# Patient Record
Sex: MALE | Race: WHITE | Employment: FULL TIME | RURAL
[De-identification: names, ages, dates, MRNs, and addresses within clinical notes are randomized per-mention and may not be internally consistent; named-entity substitution may affect disease eponyms.]

---

## 2017-02-13 ENCOUNTER — TELEPHONE (OUTPATIENT)
Dept: FAMILY MEDICINE CLINIC | Age: 52
End: 2017-02-13

## 2017-02-13 NOTE — TELEPHONE ENCOUNTER
Patient's son was seen last week for the flu. He now has symptoms. Ramno. We need to call wife on what we'll do.

## 2017-10-06 ENCOUNTER — HOSPITAL ENCOUNTER (EMERGENCY)
Age: 52
Discharge: HOME | End: 2017-10-06
Attending: EMERGENCY MEDICINE
Payer: COMMERCIAL

## 2017-10-06 ENCOUNTER — APPOINTMENT (OUTPATIENT)
Dept: GENERAL RADIOLOGY | Age: 52
End: 2017-10-06
Attending: EMERGENCY MEDICINE
Payer: COMMERCIAL

## 2017-10-06 VITALS
TEMPERATURE: 99.7 F | DIASTOLIC BLOOD PRESSURE: 93 MMHG | HEART RATE: 89 BPM | OXYGEN SATURATION: 98 % | SYSTOLIC BLOOD PRESSURE: 162 MMHG | HEIGHT: 68 IN | RESPIRATION RATE: 11 BRPM

## 2017-10-06 DIAGNOSIS — R07.89 ATYPICAL CHEST PAIN: Primary | ICD-10-CM

## 2017-10-06 LAB
ALBUMIN SERPL-MCNC: 4.3 G/DL (ref 3.5–5)
ALBUMIN/GLOB SERPL: 1.4 {RATIO} (ref 1.1–2.2)
ALP SERPL-CCNC: 89 U/L (ref 45–117)
ALT SERPL-CCNC: 50 U/L (ref 12–78)
ANION GAP SERPL CALC-SCNC: 10 MMOL/L (ref 5–15)
AST SERPL-CCNC: 25 U/L (ref 15–37)
BASOPHILS # BLD: 0.1 K/UL (ref 0–0.1)
BASOPHILS NFR BLD: 1 % (ref 0–1)
BILIRUB SERPL-MCNC: 0.7 MG/DL (ref 0.2–1)
BUN SERPL-MCNC: 10 MG/DL (ref 6–20)
BUN/CREAT SERPL: 10 (ref 12–20)
CALCIUM SERPL-MCNC: 8.7 MG/DL (ref 8.5–10.1)
CHLORIDE SERPL-SCNC: 105 MMOL/L (ref 97–108)
CK SERPL-CCNC: 145 U/L (ref 39–308)
CO2 SERPL-SCNC: 25 MMOL/L (ref 21–32)
CREAT SERPL-MCNC: 1.03 MG/DL (ref 0.7–1.3)
D DIMER PPP FEU-MCNC: <0.17 MG/L FEU (ref 0–0.65)
EOSINOPHIL # BLD: 0.1 K/UL (ref 0–0.4)
EOSINOPHIL NFR BLD: 1 % (ref 0–7)
ERYTHROCYTE [DISTWIDTH] IN BLOOD BY AUTOMATED COUNT: 12.9 % (ref 11.5–14.5)
GLOBULIN SER CALC-MCNC: 3.1 G/DL (ref 2–4)
GLUCOSE SERPL-MCNC: 104 MG/DL (ref 65–100)
HCT VFR BLD AUTO: 44.2 % (ref 36.6–50.3)
HGB BLD-MCNC: 15.5 G/DL (ref 12.1–17)
LYMPHOCYTES # BLD: 3 K/UL (ref 0.8–3.5)
LYMPHOCYTES NFR BLD: 35 % (ref 12–49)
MCH RBC QN AUTO: 30.3 PG (ref 26–34)
MCHC RBC AUTO-ENTMCNC: 35.1 G/DL (ref 30–36.5)
MCV RBC AUTO: 86.3 FL (ref 80–99)
MONOCYTES # BLD: 0.5 K/UL (ref 0–1)
MONOCYTES NFR BLD: 6 % (ref 5–13)
NEUTS SEG # BLD: 4.9 K/UL (ref 1.8–8)
NEUTS SEG NFR BLD: 57 % (ref 32–75)
PLATELET # BLD AUTO: 220 K/UL (ref 150–400)
POTASSIUM SERPL-SCNC: 3.9 MMOL/L (ref 3.5–5.1)
PROT SERPL-MCNC: 7.4 G/DL (ref 6.4–8.2)
RBC # BLD AUTO: 5.12 M/UL (ref 4.1–5.7)
SODIUM SERPL-SCNC: 140 MMOL/L (ref 136–145)
TROPONIN I SERPL-MCNC: <0.04 NG/ML
TROPONIN I SERPL-MCNC: <0.04 NG/ML
WBC # BLD AUTO: 8.6 K/UL (ref 4.1–11.1)

## 2017-10-06 PROCEDURE — 71010 XR CHEST PORT: CPT

## 2017-10-06 PROCEDURE — 84484 ASSAY OF TROPONIN QUANT: CPT | Performed by: EMERGENCY MEDICINE

## 2017-10-06 PROCEDURE — 74011000250 HC RX REV CODE- 250: Performed by: EMERGENCY MEDICINE

## 2017-10-06 PROCEDURE — 80053 COMPREHEN METABOLIC PANEL: CPT | Performed by: EMERGENCY MEDICINE

## 2017-10-06 PROCEDURE — 85025 COMPLETE CBC W/AUTO DIFF WBC: CPT | Performed by: EMERGENCY MEDICINE

## 2017-10-06 PROCEDURE — 99284 EMERGENCY DEPT VISIT MOD MDM: CPT

## 2017-10-06 PROCEDURE — 85379 FIBRIN DEGRADATION QUANT: CPT | Performed by: EMERGENCY MEDICINE

## 2017-10-06 PROCEDURE — 82550 ASSAY OF CK (CPK): CPT | Performed by: EMERGENCY MEDICINE

## 2017-10-06 PROCEDURE — 93005 ELECTROCARDIOGRAM TRACING: CPT

## 2017-10-06 PROCEDURE — 74011250637 HC RX REV CODE- 250/637: Performed by: EMERGENCY MEDICINE

## 2017-10-06 PROCEDURE — 36415 COLL VENOUS BLD VENIPUNCTURE: CPT | Performed by: EMERGENCY MEDICINE

## 2017-10-06 RX ORDER — LIDOCAINE HYDROCHLORIDE 20 MG/ML
10 SOLUTION OROPHARYNGEAL
Status: COMPLETED | OUTPATIENT
Start: 2017-10-06 | End: 2017-10-06

## 2017-10-06 RX ADMIN — LIDOCAINE HYDROCHLORIDE 10 ML: 20 SOLUTION ORAL; TOPICAL at 21:29

## 2017-10-06 RX ADMIN — ALUMINUM HYDROXIDE AND MAGNESIUM HYDROXIDE 30 ML: 200; 200 SUSPENSION ORAL at 21:29

## 2017-10-07 LAB
ATRIAL RATE: 90 BPM
CALCULATED P AXIS, ECG09: 63 DEGREES
CALCULATED R AXIS, ECG10: 63 DEGREES
CALCULATED T AXIS, ECG11: 53 DEGREES
DIAGNOSIS, 93000: NORMAL
P-R INTERVAL, ECG05: 138 MS
Q-T INTERVAL, ECG07: 354 MS
QRS DURATION, ECG06: 80 MS
QTC CALCULATION (BEZET), ECG08: 433 MS
VENTRICULAR RATE, ECG03: 90 BPM

## 2017-10-07 NOTE — ED PROVIDER NOTES
[facility].  EMERGENCY DEPARTMENT HISTORY AND PHYSICAL EXAM       Date of Service: 10/6/2017   Patient Name: Medhat Benitez   YOB: 1965  Medical Record Number: [id number]    History of Presenting Illness     Chief Complaint   Patient presents with    Chest Pain     hx of costachondritis, woke up around 0200 this morning, has been off and on all day, sx's increase when he is still        History Provided By:  patient    Additional History:   Medhat Benitez is a 46 y.o. male with PMhx significant for GERD, anxiety, costochondritis, HTN, diverticulosis who presents ambulatory to the ED with cc of intermittent dull pressure like mid-sternal CP that onset this morning at 0200. He reports taking anti-acids with no relief of his symptoms. Pt states that his symptoms are exacerbated at rest and with certain movement and relieved with activity. Pt reports a hx of chest pain secondary to acid reflux, costochondritis, and hiatal hernia. Pt reports having lower back pain that is unchanged from baseline. He reports having 2 cardiac stress tests noting that his last was 1 year ago and states that they were both negative. Pt reports a paternal FMHx significant for MI. He denies hx of DVT or PE. He denies any recent travels or surgeries. Pt specifically denies any cough, hemoptysis, N/V/D, diaphoresis, abdominal pain, SOB, fevers, or chills. Social Hx: - Tobacco, - EtOH, - Illicit Drugs    There are no other complaints, changes or physical findings at this time.     Primary Care Provider: None    Past History     Past Medical History:   Past Medical History:   Diagnosis Date    Anxiety     Costochondritis     Diverticulosis     GERD (gastroesophageal reflux disease)     Hypertension     Rhinitis, allergic         Past Surgical History:   Past Surgical History:   Procedure Laterality Date    HX TONSIL AND ADENOIDECTOMY Bilateral         Family History:   Family History Problem Relation Age of Onset    Cancer Mother 48     colon    Diabetes Father     Arrhythmia Father      a fib    Heart Disease Father     Arthritis-osteo Sister     COPD Maternal Grandfather     Heart Disease Paternal Grandmother     Stroke Paternal Grandmother     Cancer Paternal Grandfather      Head/Neck--smoker        Social History:   Social History   Substance Use Topics    Smoking status: Never Smoker    Smokeless tobacco: Not on file    Alcohol use No        Allergies: Allergies   Allergen Reactions    Demerol [Meperidine] Anaphylaxis and Other (comments)     Caused BP To Drop.  Aleve [Naproxen Sodium] Hives    Phenergan [Promethazine] Unknown (comments)         Review of Systems   Review of Systems   Constitutional: Negative for chills, diaphoresis, fatigue and fever. HENT: Positive for congestion. Negative for rhinorrhea and sore throat. Eyes: Negative for pain, discharge and visual disturbance. Respiratory: Negative for cough, chest tightness, shortness of breath and wheezing.         - hemoptysis   Cardiovascular: Positive for chest pain (mis-sternal). Negative for palpitations and leg swelling. Gastrointestinal: Negative for abdominal pain, constipation, diarrhea, nausea and vomiting. Genitourinary: Negative for dysuria, frequency and hematuria. Musculoskeletal: Positive for back pain (baseline unchanged). Negative for arthralgias and myalgias. Skin: Negative for rash. Neurological: Negative for dizziness, weakness, light-headedness and headaches. Psychiatric/Behavioral: Negative. Physical Exam    Physical Exam   Constitutional: He is oriented to person, place, and time. He appears well-developed and well-nourished. No distress. HENT:   Head: Normocephalic and atraumatic. Eyes: EOM are normal. Right eye exhibits no discharge. Left eye exhibits no discharge. No scleral icterus. Neck: Normal range of motion. Neck supple. No tracheal deviation present. Cardiovascular: Normal rate, regular rhythm, normal heart sounds and intact distal pulses. Exam reveals no gallop and no friction rub. No murmur heard. Pulmonary/Chest: Effort normal and breath sounds normal. No respiratory distress. He has no wheezes. He has no rales. Abdominal: Soft. He exhibits no distension. There is no tenderness. Musculoskeletal: Normal range of motion. He exhibits no edema. Anterior chest wall tenderness. No calf tenderness. Lymphadenopathy:     He has no cervical adenopathy. Neurological: He is alert and oriented to person, place, and time. Skin: Skin is warm and dry. No rash noted. Psychiatric: He has a normal mood and affect. Nursing note and vitals reviewed. Medical Decision Making   I am the first provider for this patient. I reviewed the vital signs, available nursing notes, past medical history, past surgical history, family history and social history. Old Medical Records: Old medical records. Nursing notes. Provider Notes:   Patient is a 45 yo male who presents to ED with chest pain that is reproducible on exam.  Differential includes atypical chest pain, stable angina, unstable angina, MI, PE, pleurisy, costochondritis, pneumonia, bronchitis, MSK pain. Do not suspect dissection.  - CBC, CMP  - Geovanna  - D-dimer  - CXR    ED Course:  9:20 PM   Initial assessment performed. The patients presenting problems have been discussed, and they are in agreement with the care plan formulated and outlined with them. I have encouraged them to ask questions as they arise throughout their visit. PROGRESS NOTE:  10:30 PM  Troponin negative x 2, d-dimer negative. Heart score 3 so will discharge with cardiology follow up. Discussed diagnosis, follow up, return instructions, test results, x-rays and medications with the patient and/or family. The patient and/or family have been given the opportunity to ask questions.   The patient and/or family express understanding of the plan of care, follow-up appointments and return instructions. The patient and/or family agree to follow up with cardiology as directed and to return immediately if the chest pain worsens. The patient and family express understanding that although cardiac testing at this time is negative, a cardiac problem could still be present and that a follow-up appointment with a cardiologist for further evaluation and risk factor modification is necessary to complete the evaluation of this complaint. Provided customary return to ED instructions. Vivien Shane MD    Diagnostic Study Results     Labs -      Recent Results (from the past 12 hour(s))   EKG, 12 LEAD, INITIAL    Collection Time: 10/06/17  7:40 PM   Result Value Ref Range    Ventricular Rate 90 BPM    Atrial Rate 90 BPM    P-R Interval 138 ms    QRS Duration 80 ms    Q-T Interval 354 ms    QTC Calculation (Bezet) 433 ms    Calculated P Axis 63 degrees    Calculated R Axis 63 degrees    Calculated T Axis 53 degrees    Diagnosis       Normal sinus rhythm  Normal ECG  No previous ECGs available     CBC WITH AUTOMATED DIFF    Collection Time: 10/06/17  7:59 PM   Result Value Ref Range    WBC 8.6 4.1 - 11.1 K/uL    RBC 5.12 4.10 - 5.70 M/uL    HGB 15.5 12.1 - 17.0 g/dL    HCT 44.2 36.6 - 50.3 %    MCV 86.3 80.0 - 99.0 FL    MCH 30.3 26.0 - 34.0 PG    MCHC 35.1 30.0 - 36.5 g/dL    RDW 12.9 11.5 - 14.5 %    PLATELET 295 359 - 457 K/uL    NEUTROPHILS 57 32 - 75 %    LYMPHOCYTES 35 12 - 49 %    MONOCYTES 6 5 - 13 %    EOSINOPHILS 1 0 - 7 %    BASOPHILS 1 0 - 1 %    ABS. NEUTROPHILS 4.9 1.8 - 8.0 K/UL    ABS. LYMPHOCYTES 3.0 0.8 - 3.5 K/UL    ABS. MONOCYTES 0.5 0.0 - 1.0 K/UL    ABS. EOSINOPHILS 0.1 0.0 - 0.4 K/UL    ABS.  BASOPHILS 0.1 0.0 - 0.1 K/UL   METABOLIC PANEL, COMPREHENSIVE    Collection Time: 10/06/17  7:59 PM   Result Value Ref Range    Sodium 140 136 - 145 mmol/L    Potassium 3.9 3.5 - 5.1 mmol/L    Chloride 105 97 - 108 mmol/L    CO2 25 21 - 32 mmol/L    Anion gap 10 5 - 15 mmol/L    Glucose 104 (H) 65 - 100 mg/dL    BUN 10 6 - 20 MG/DL    Creatinine 1.03 0.70 - 1.30 MG/DL    BUN/Creatinine ratio 10 (L) 12 - 20      GFR est AA >60 >60 ml/min/1.73m2    GFR est non-AA >60 >60 ml/min/1.73m2    Calcium 8.7 8.5 - 10.1 MG/DL    Bilirubin, total 0.7 0.2 - 1.0 MG/DL    ALT (SGPT) 50 12 - 78 U/L    AST (SGOT) 25 15 - 37 U/L    Alk. phosphatase 89 45 - 117 U/L    Protein, total 7.4 6.4 - 8.2 g/dL    Albumin 4.3 3.5 - 5.0 g/dL    Globulin 3.1 2.0 - 4.0 g/dL    A-G Ratio 1.4 1.1 - 2.2     CK W/ REFLX CKMB    Collection Time: 10/06/17  7:59 PM   Result Value Ref Range     39 - 308 U/L   TROPONIN I    Collection Time: 10/06/17  7:59 PM   Result Value Ref Range    Troponin-I, Qt. <0.04 <0.05 ng/mL   D DIMER    Collection Time: 10/06/17  7:59 PM   Result Value Ref Range    D-dimer <0.17 0.00 - 0.65 mg/L FEU   TROPONIN I    Collection Time: 10/06/17  9:49 PM   Result Value Ref Range    Troponin-I, Qt. <0.04 <0.05 ng/mL       Radiologic Studies -  The following have been ordered and reviewed:    CXR Results  (Last 48 hours)               10/06/17 2007  XR CHEST PORT Final result    Impression:  IMPRESSION:    Clear lungs. Narrative:  PORTABLE CHEST RADIOGRAPH/S: 10/6/2017 8:07 PM       INDICATION: Chest pain. Costochondritis, with symptoms beginning at 0200 hours   and intermittent throughout the day. COMPARISON: None. TECHNIQUE: Portable frontal upright radiograph/s of the chest.       FINDINGS:    The lungs are clear. The central airways are patent. No pneumothorax or pleural   effusion. Vital Signs-Reviewed the patient's vital signs.    Patient Vitals for the past 12 hrs:   Temp Pulse Resp BP SpO2   10/06/17 2200 - 89 11 (!) 162/93 98 %   10/06/17 2030 - 88 16 152/80 98 %   10/06/17 1940 99.7 °F (37.6 °C) 90 16 - 100 %       Medications Given in the ED:  Medications   aluminum-magnesium hydroxide (MAALOX) oral suspension 30 mL (30 mL Oral Given 10/6/17 2129)   lidocaine (XYLOCAINE) 2 % viscous solution 10 mL (10 mL Mouth/Throat Given 10/6/17 2129)     EKG    EKG interpretation: (Preliminary) 1940  Rhythm: normal sinus rhythm; and regular . Rate (approx.): 90; Axis: normal; P wave: normal; QRS interval: normal ; ST/T wave: normal;   Written by Kadi Camacho, ED scribe, as dictated by Antonia Oswald MD    Diagnosis   Clinical Impression:   1. Atypical chest pain         Disposition Note:    1:   Follow-up Information     Follow up With Details Comments Contact Info    Barbara Hernandez MD  Please follow up with cardiology as soon as possible [phone] [phone]      None In 3 days Please follow up with primary care provider None [phone] Patient stated that they have no PCP      [facility] EMERGENCY DEPT  As needed, If symptoms worsen [address] [place] [address]  [phone]        2:   Current Discharge Medication List        Return to ED if Worse    DISCHARGE NOTE  10:34 PM  The patient has been re-evaluated and is ready for discharge. Reviewed available results with patient. Counseled patient on diagnosis and care plan. Patient has expressed understanding, and all questions have been answered. Patient agrees with plan and agrees to follow up as recommended, or return to the ED if their symptoms worsen. Discharge instructions have been provided and explained to the patient, along with reasons to return to the ED. This note is prepared by Kadi Camacho, acting as Scribe for Antonia Oswald MD.    Antonia Oswald MD: The scribe's documentation has been prepared under my direction and personally reviewed by me in its entirety. I confirm that the note above accurately reflects all work, treatment, procedures, and medical decision making performed by me.

## 2017-10-07 NOTE — ED NOTES
Dr. Luna Mckay reviewed discharge instructions with the patient. The patient verbalized understanding.

## 2017-10-07 NOTE — DISCHARGE INSTRUCTIONS
Chest Pain: Care Instructions  Your Care Instructions  There are many things that can cause chest pain. Some are not serious and will get better on their own in a few days. But some kinds of chest pain need more testing and treatment. Your doctor may have recommended a follow-up visit in the next 8 to 12 hours. If you are not getting better, you may need more tests or treatment. Even though your doctor has released you, you still need to watch for any problems. The doctor carefully checked you, but sometimes problems can develop later. If you have new symptoms or if your symptoms do not get better, get medical care right away. If you have worse or different chest pain or pressure that lasts more than 5 minutes or you passed out (lost consciousness), call 911 or seek other emergency help right away. A medical visit is only one step in your treatment. Even if you feel better, you still need to do what your doctor recommends, such as going to all suggested follow-up appointments and taking medicines exactly as directed. This will help you recover and help prevent future problems. How can you care for yourself at home? · Rest until you feel better. · Take your medicine exactly as prescribed. Call your doctor if you think you are having a problem with your medicine. · Do not drive after taking a prescription pain medicine. When should you call for help? Call 911 if:  · You passed out (lost consciousness). · You have severe difficulty breathing. · You have symptoms of a heart attack. These may include:  ¨ Chest pain or pressure, or a strange feeling in your chest.  ¨ Sweating. ¨ Shortness of breath. ¨ Nausea or vomiting. ¨ Pain, pressure, or a strange feeling in your back, neck, jaw, or upper belly or in one or both shoulders or arms. ¨ Lightheadedness or sudden weakness. ¨ A fast or irregular heartbeat.   After you call 911, the  may tell you to chew 1 adult-strength or 2 to 4 low-dose aspirin. Wait for an ambulance. Do not try to drive yourself. Call your doctor today if:  · You have any trouble breathing. · Your chest pain gets worse. · You are dizzy or lightheaded, or you feel like you may faint. · You are not getting better as expected. · You are having new or different chest pain. Where can you learn more? Go to http://anisa-dulce.info/. Enter A120 in the search box to learn more about \"Chest Pain: Care Instructions. \"  Current as of: March 20, 2017  Content Version: 11.3  © 0359-7617 [employer]. Care instructions adapted under license by [employer] ( [employer] ) (which disclaims liability or warranty for this information). If you have questions about a medical condition or this instruction, always ask your healthcare professional. [address] any warranty or liability for your use of this information.

## 2022-05-25 ENCOUNTER — HOSPITAL ENCOUNTER (EMERGENCY)
Age: 57
Discharge: HOME | End: 2022-05-25
Attending: FAMILY MEDICINE
Payer: COMMERCIAL

## 2022-05-25 VITALS
BODY MASS INDEX: 34.86 KG/M2 | RESPIRATION RATE: 16 BRPM | OXYGEN SATURATION: 98 % | TEMPERATURE: 99.4 F | SYSTOLIC BLOOD PRESSURE: 190 MMHG | WEIGHT: 230 LBS | HEART RATE: 90 BPM | HEIGHT: 68 IN | DIASTOLIC BLOOD PRESSURE: 97 MMHG

## 2022-05-25 DIAGNOSIS — I10 HYPERTENSION, UNSPECIFIED TYPE: Primary | ICD-10-CM

## 2022-05-25 LAB
ALBUMIN SERPL-MCNC: 4.5 G/DL (ref 3.5–5)
ALBUMIN/GLOB SERPL: 1.4 {RATIO} (ref 1.1–2.2)
ALP SERPL-CCNC: 100 U/L (ref 45–117)
ALT SERPL-CCNC: 62 U/L (ref 12–78)
ANION GAP SERPL CALC-SCNC: 12 MMOL/L (ref 5–15)
AST SERPL-CCNC: 37 U/L (ref 15–37)
BASOPHILS # BLD: 0.1 K/UL (ref 0–0.1)
BASOPHILS NFR BLD: 1 % (ref 0–1)
BILIRUB SERPL-MCNC: 0.7 MG/DL (ref 0.2–1)
BUN SERPL-MCNC: 8 MG/DL (ref 6–20)
BUN/CREAT SERPL: 8 (ref 12–20)
CALCIUM SERPL-MCNC: 9.1 MG/DL (ref 8.5–10.1)
CHLORIDE SERPL-SCNC: 102 MMOL/L (ref 97–108)
CO2 SERPL-SCNC: 27 MMOL/L (ref 21–32)
CREAT SERPL-MCNC: 0.98 MG/DL (ref 0.7–1.3)
DIFFERENTIAL METHOD BLD: NORMAL
EOSINOPHIL # BLD: 0.1 K/UL (ref 0–0.4)
EOSINOPHIL NFR BLD: 2 % (ref 0–7)
ERYTHROCYTE [DISTWIDTH] IN BLOOD BY AUTOMATED COUNT: 12.6 % (ref 11.5–14.5)
GLOBULIN SER CALC-MCNC: 3.2 G/DL (ref 2–4)
GLUCOSE SERPL-MCNC: 146 MG/DL (ref 65–100)
HCT VFR BLD AUTO: 46.7 % (ref 36.6–50.3)
HGB BLD-MCNC: 16.6 G/DL (ref 12.1–17)
IMM GRANULOCYTES # BLD AUTO: 0 K/UL (ref 0–0.04)
IMM GRANULOCYTES NFR BLD AUTO: 0 % (ref 0–0.5)
LYMPHOCYTES # BLD: 2.7 K/UL (ref 0.8–3.5)
LYMPHOCYTES NFR BLD: 32 % (ref 12–49)
MCH RBC QN AUTO: 30.5 PG (ref 26–34)
MCHC RBC AUTO-ENTMCNC: 35.5 G/DL (ref 30–36.5)
MCV RBC AUTO: 85.7 FL (ref 80–99)
MONOCYTES # BLD: 0.7 K/UL (ref 0–1)
MONOCYTES NFR BLD: 8 % (ref 5–13)
NEUTS SEG # BLD: 4.9 K/UL (ref 1.8–8)
NEUTS SEG NFR BLD: 57 % (ref 32–75)
NRBC # BLD: 0 K/UL (ref 0–0.01)
NRBC BLD-RTO: 0 PER 100 WBC
PLATELET # BLD AUTO: 224 K/UL (ref 150–400)
PMV BLD AUTO: 10.1 FL (ref 8.9–12.9)
POTASSIUM SERPL-SCNC: 3.6 MMOL/L (ref 3.5–5.1)
PROT SERPL-MCNC: 7.7 G/DL (ref 6.4–8.2)
RBC # BLD AUTO: 5.45 M/UL (ref 4.1–5.7)
SODIUM SERPL-SCNC: 141 MMOL/L (ref 136–145)
WBC # BLD AUTO: 8.5 K/UL (ref 4.1–11.1)

## 2022-05-25 PROCEDURE — 36415 COLL VENOUS BLD VENIPUNCTURE: CPT

## 2022-05-25 PROCEDURE — 74011250637 HC RX REV CODE- 250/637: Performed by: FAMILY MEDICINE

## 2022-05-25 PROCEDURE — 85025 COMPLETE CBC W/AUTO DIFF WBC: CPT

## 2022-05-25 PROCEDURE — 80053 COMPREHEN METABOLIC PANEL: CPT

## 2022-05-25 PROCEDURE — 93005 ELECTROCARDIOGRAM TRACING: CPT

## 2022-05-25 PROCEDURE — 99284 EMERGENCY DEPT VISIT MOD MDM: CPT

## 2022-05-25 RX ORDER — AMLODIPINE BESYLATE 5 MG/1
5 TABLET ORAL DAILY
Qty: 15 TABLET | Refills: 0 | Status: SHIPPED | OUTPATIENT
Start: 2022-05-25 | End: 2022-06-06 | Stop reason: DRUGHIGH

## 2022-05-25 RX ORDER — AMLODIPINE BESYLATE 5 MG/1
5 TABLET ORAL
Status: COMPLETED | OUTPATIENT
Start: 2022-05-25 | End: 2022-05-25

## 2022-05-25 RX ADMIN — AMLODIPINE BESYLATE 5 MG: 5 TABLET ORAL at 22:39

## 2022-05-26 NOTE — ED PROVIDER NOTES
EMERGENCY DEPARTMENT HISTORY AND PHYSICAL EXAM          Date: 5/25/2022  Patient Name: Agustin Delgadillo    History of Presenting Illness     Chief Complaint   Patient presents with    Hypertension       History Provided By: Patient    HPI: Agustin Delgadillo is a 62 y.o. male, pmhx htn but on no medications, who presents to the ED c/o elevated blood pressure that he noted tonight at home. He has had hypertension in the past that he can sense because he feels anxious and jittery, and tonight he recognized that feeling. He took his blood pressure tonight and found it to be over 576 systolic. He has had no chest pain, dyspnea, headaches, dizziness, or unilateral weakness. He has been on beta blockers in the past and did not tolerate them. PCP: None    Allergies: see list  Social Hx: No tobacco, vaping, EtOH, Illicit Drugs; Lives locally. [employer]. There are no other complaints, changes, or physical findings at this time. Current Outpatient Medications   Medication Sig Dispense Refill    amLODIPine (Norvasc) 5 mg tablet Take 1 Tablet by mouth daily. 15 Tablet 0    KRILL OIL PO Take 1 Cap by mouth daily.  fluticasone (FLONASE) 50 mcg/actuation nasal spray 2 sprays by Both Nostrils route daily. 1 Bottle 5    fexofenadine-pseudoephedrine (ALLEGRA-D 24 HOUR) 180-240 mg per tablet Take 1 Tab by mouth daily.  multivitamin (ONE A DAY) tablet Take 1 Tablet by mouth daily as needed.  omeprazole (PRILOSEC) 20 mg capsule Take 20 mg by mouth daily.          Past History     Past Medical History:  Past Medical History:   Diagnosis Date    Anxiety     Costochondritis     Diverticulosis     GERD (gastroesophageal reflux disease)     Hypertension     Rhinitis, allergic        Past Surgical History:  Past Surgical History:   Procedure Laterality Date    HX TONSIL AND ADENOIDECTOMY Bilateral        Family History:  Family History   Problem Relation Age of Onset    Cancer Mother 48        colon  Diabetes Father     Arrhythmia Father         a fib    Heart Disease Father     OSTEOARTHRITIS Sister     COPD Maternal Grandfather     Heart Disease Paternal Grandmother     Stroke Paternal Grandmother     Cancer Paternal Grandfather         Head/Neck--smoker       Social History:  Social History     Tobacco Use    Smoking status: Never Smoker    Smokeless tobacco: Never Used   Substance Use Topics    Alcohol use: No    Drug use: Not on file       Allergies: Allergies   Allergen Reactions    Demerol [Meperidine] Anaphylaxis and Other (comments)     Caused BP To Drop.  Aleve [Naproxen Sodium] Hives    Phenergan [Promethazine] Unknown (comments)         Review of Systems   Review of Systems   Respiratory: Negative for shortness of breath. Cardiovascular: Negative for chest pain. Neurological: Negative for dizziness, weakness, light-headedness and headaches. Psychiatric/Behavioral: The patient is nervous/anxious. Physical Exam   Physical Exam  Vitals reviewed. Constitutional:       General: He is not in acute distress. Appearance: He is well-developed. He is not diaphoretic. HENT:      Head: Normocephalic and atraumatic. Right Ear: External ear normal.      Left Ear: External ear normal.      Nose: Nose normal.      Mouth/Throat:      Mouth: Mucous membranes are moist.      Pharynx: No oropharyngeal exudate. Eyes:      General: No scleral icterus. Right eye: No discharge. Left eye: No discharge. Conjunctiva/sclera: Conjunctivae normal.      Pupils: Pupils are equal, round, and reactive to light. Neck:      Thyroid: No thyromegaly. Vascular: No JVD. Trachea: No tracheal deviation. Cardiovascular:      Rate and Rhythm: Normal rate and regular rhythm. Heart sounds: Normal heart sounds. No murmur heard. No friction rub. No gallop. Pulmonary:      Effort: Pulmonary effort is normal. No respiratory distress. Breath sounds:  No wheezing or rales. Abdominal:      General: Bowel sounds are normal. There is no distension. Palpations: Abdomen is soft. Tenderness: There is no abdominal tenderness. There is no rebound. Musculoskeletal:         General: No tenderness or deformity. Normal range of motion. Cervical back: Normal range of motion and neck supple. Skin:     General: Skin is warm and dry. Neurological:      Mental Status: He is alert and oriented to person, place, and time. Cranial Nerves: No cranial nerve deficit. Coordination: Coordination normal.      Deep Tendon Reflexes: Reflexes are normal and symmetric. Diagnostic Study Results     Labs -     Recent Results (from the past 12 hour(s))   CBC WITH AUTOMATED DIFF    Collection Time: 05/25/22  9:25 PM   Result Value Ref Range    WBC 8.5 4.1 - 11.1 K/uL    RBC 5.45 4.10 - 5.70 M/uL    HGB 16.6 12.1 - 17.0 g/dL    HCT 46.7 36.6 - 50.3 %    MCV 85.7 80.0 - 99.0 FL    MCH 30.5 26.0 - 34.0 PG    MCHC 35.5 30.0 - 36.5 g/dL    RDW 12.6 11.5 - 14.5 %    PLATELET 115 439 - 406 K/uL    MPV 10.1 8.9 - 12.9 FL    NRBC 0.0 0  WBC    ABSOLUTE NRBC 0.00 0.00 - 0.01 K/uL    NEUTROPHILS 57 32 - 75 %    LYMPHOCYTES 32 12 - 49 %    MONOCYTES 8 5 - 13 %    EOSINOPHILS 2 0 - 7 %    BASOPHILS 1 0 - 1 %    IMMATURE GRANULOCYTES 0 0.0 - 0.5 %    ABS. NEUTROPHILS 4.9 1.8 - 8.0 K/UL    ABS. LYMPHOCYTES 2.7 0.8 - 3.5 K/UL    ABS. MONOCYTES 0.7 0.0 - 1.0 K/UL    ABS. EOSINOPHILS 0.1 0.0 - 0.4 K/UL    ABS. BASOPHILS 0.1 0.0 - 0.1 K/UL    ABS. IMM.  GRANS. 0.0 0.00 - 0.04 K/UL    DF AUTOMATED     METABOLIC PANEL, COMPREHENSIVE    Collection Time: 05/25/22  9:25 PM   Result Value Ref Range    Sodium 141 136 - 145 mmol/L    Potassium 3.6 3.5 - 5.1 mmol/L    Chloride 102 97 - 108 mmol/L    CO2 27 21 - 32 mmol/L    Anion gap 12 5 - 15 mmol/L    Glucose 146 (H) 65 - 100 mg/dL    BUN 8 6 - 20 MG/DL    Creatinine 0.98 0.70 - 1.30 MG/DL    BUN/Creatinine ratio 8 (L) 12 - 20 GFR est AA >60 >60 ml/min/1.73m2    GFR est non-AA >60 >60 ml/min/1.73m2    Calcium 9.1 8.5 - 10.1 MG/DL    Bilirubin, total 0.7 0.2 - 1.0 MG/DL    ALT (SGPT) 62 12 - 78 U/L    AST (SGOT) 37 15 - 37 U/L    Alk. phosphatase 100 45 - 117 U/L    Protein, total 7.7 6.4 - 8.2 g/dL    Albumin 4.5 3.5 - 5.0 g/dL    Globulin 3.2 2.0 - 4.0 g/dL    A-G Ratio 1.4 1.1 - 2.2     EKG, 12 LEAD, INITIAL    Collection Time: 05/25/22  9:25 PM   Result Value Ref Range    Ventricular Rate 94 BPM    Atrial Rate 94 BPM    P-R Interval 148 ms    QRS Duration 88 ms    Q-T Interval 360 ms    QTC Calculation (Bezet) 450 ms    Calculated P Axis 49 degrees    Calculated R Axis 45 degrees    Calculated T Axis 27 degrees    Diagnosis       Normal sinus rhythm  Normal ECG  No previous ECGs available         Radiologic Studies -   No orders to display     CT Results  (Last 48 hours)    None        CXR Results  (Last 48 hours)    None            Medical Decision Making   I am the first provider for this patient. I reviewed the vital signs, available nursing notes, past medical history, past surgical history, family history and social history. Vital Signs-Reviewed the patient's vital signs. Patient Vitals for the past 12 hrs:   Temp Pulse Resp BP SpO2   05/25/22 2250  90 16 (!) 190/97    05/25/22 2239  98  (!) 193/100    05/25/22 2145  96 16 (!) 187/101    05/25/22 2112 99.4 °F (37.4 °C) 99 16 (!) 210/105 98 %       Pulse Oximetry Analysis - 98% on RA    Cardiac Monitor:   Rate: 90 bpm  Rhythm: Normal Sinus Rhythm      Records Reviewed: Nursing Notes, Old Medical Records and Previous electrocardiograms    Provider Notes (Medical Decision Making):   MDM: Man with h/o borderline htn, now with accelerated htn. Will do labs, ECG, prescribe antihypertensive. ED Course:   Initial assessment performed. The patients presenting problems have been discussed, and they are in agreement with the care plan formulated and outlined with them.   I have encouraged them to ask questions as they arise throughout their visit. EKG interpretation: (Preliminary)  Rhythm: NSR, Normal ECG. This EKG was interpreted by ED Provider Dr Keri Crockett MD      PROGRESS NOTE:  BP came down somewhat while he was in the ED, to 190/97. He was given 5 mg amlodipine in the ED and a rx sent to the pharmacy for 2 weeks' supply. Discharge note:  Pt re-evaluated and noted to be feeling better, ready for discharge. Updated pt on all final lab  findings. Will follow up as instructed. All questions have been answered, pt voiced understanding and agreement with plan. Specific return precautions provided as well as instructions to return to the ED should sx worsen at any time. Vital signs stable for discharge. Critical Care Time: 0      Diagnosis     Clinical Impression:   1. Hypertension, unspecified type        PLAN:  1. Discharge Medication List as of 5/25/2022 10:45 PM      START taking these medications    Details   amLODIPine (Norvasc) 5 mg tablet Take 1 Tablet by mouth daily. , Normal, Disp-15 Tablet, R-0         CONTINUE these medications which have NOT CHANGED    Details   KRILL OIL PO Take 1 Cap by mouth daily. , Historical Med      omeprazole (PRILOSEC) 20 mg capsule Take 20 mg by mouth daily. , Historical Med      fluticasone (FLONASE) 50 mcg/actuation nasal spray 2 sprays by Both Nostrils route daily. , Normal, Disp-1 Bottle, R-5      fexofenadine-pseudoephedrine (ALLEGRA-D 24 HOUR) 180-240 mg per tablet Take 1 Tab by mouth daily. , Historical Med      multivitamin (ONE A DAY) tablet Take 1 Tablet by mouth daily as needed., Historical Med           2.    Follow-up Information     Follow up With Specialties Details Why Contact Soni Mann NP Nurse Practitioner In 1 week  [address]  [address] [phone]          Return to ED if worse     Disposition:  Home

## 2022-05-28 LAB
ATRIAL RATE: 94 BPM
CALCULATED P AXIS, ECG09: 49 DEGREES
CALCULATED R AXIS, ECG10: 45 DEGREES
CALCULATED T AXIS, ECG11: 27 DEGREES
DIAGNOSIS, 93000: NORMAL
P-R INTERVAL, ECG05: 148 MS
Q-T INTERVAL, ECG07: 360 MS
QRS DURATION, ECG06: 88 MS
QTC CALCULATION (BEZET), ECG08: 450 MS
VENTRICULAR RATE, ECG03: 94 BPM

## 2022-06-06 ENCOUNTER — OFFICE VISIT (OUTPATIENT)
Dept: FAMILY MEDICINE CLINIC | Age: 57
End: 2022-06-06
Payer: COMMERCIAL

## 2022-06-06 VITALS
TEMPERATURE: 98 F | BODY MASS INDEX: 35.16 KG/M2 | HEIGHT: 68 IN | SYSTOLIC BLOOD PRESSURE: 146 MMHG | WEIGHT: 232 LBS | HEART RATE: 97 BPM | DIASTOLIC BLOOD PRESSURE: 84 MMHG | OXYGEN SATURATION: 97 % | RESPIRATION RATE: 18 BRPM

## 2022-06-06 DIAGNOSIS — Z76.89 ENCOUNTER TO ESTABLISH CARE: Primary | ICD-10-CM

## 2022-06-06 DIAGNOSIS — E66.01 CLASS 2 SEVERE OBESITY DUE TO EXCESS CALORIES WITH SERIOUS COMORBIDITY AND BODY MASS INDEX (BMI) OF 35.0 TO 35.9 IN ADULT (HCC): ICD-10-CM

## 2022-06-06 DIAGNOSIS — I10 PRIMARY HYPERTENSION: ICD-10-CM

## 2022-06-06 DIAGNOSIS — Z12.5 PROSTATE CANCER SCREENING: ICD-10-CM

## 2022-06-06 DIAGNOSIS — R73.09 ELEVATED GLUCOSE: ICD-10-CM

## 2022-06-06 PROCEDURE — 99204 OFFICE O/P NEW MOD 45 MIN: CPT

## 2022-06-06 RX ORDER — AMLODIPINE BESYLATE 10 MG/1
10 TABLET ORAL DAILY
Qty: 30 TABLET | Refills: 0 | Status: SHIPPED | OUTPATIENT
Start: 2022-06-06 | End: 2022-07-05 | Stop reason: SDUPTHER

## 2022-06-06 NOTE — PATIENT INSTRUCTIONS
High Blood Pressure: Care Instructions  Overview     It's normal for blood pressure to go up and down throughout the day. But if it stays up, you have high blood pressure. Another name for high blood pressure is hypertension. Despite what a lot of people think, high blood pressure usually doesn't cause headaches or make you feel dizzy or lightheaded. It usually has no symptoms. But it does increase your risk of stroke, heart attack, and other problems. You and your doctor will talk about your risks of these problems based on your blood pressure. Your doctor will give you a goal for your blood pressure. Your goal will be based on your health and your age. Lifestyle changes, such as eating healthy and being active, are always important to help lower blood pressure. You might also take medicine to reach your blood pressure goal.  Follow-up care is a key part of your treatment and safety. Be sure to make and go to all appointments, and call your doctor if you are having problems. It's also a good idea to know your test results and keep a list of the medicines you take. How can you care for yourself at home? Medical treatment  · If you stop taking your medicine, your blood pressure will go back up. You may take one or more types of medicine to lower your blood pressure. Be safe with medicines. Take your medicine exactly as prescribed. Call your doctor if you think you are having a problem with your medicine. · Talk to your doctor before you start taking aspirin every day. Aspirin can help certain people lower their risk of a heart attack or stroke. But taking aspirin isn't right for everyone, because it can cause serious bleeding. · See your doctor regularly. You may need to see the doctor more often at first or until your blood pressure comes down. · If you are taking blood pressure medicine, talk to your doctor before you take decongestants or anti-inflammatory medicine, such as ibuprofen.  Some of these medicines can raise blood pressure. · Learn how to check your blood pressure at home. Lifestyle changes  · Stay at a healthy weight. This is especially important if you put on weight around the waist. Losing even 10 pounds can help you lower your blood pressure. · If your doctor recommends it, get more exercise. Walking is a good choice. Bit by bit, increase the amount you walk every day. Try for at least 30 minutes on most days of the week. You also may want to swim, bike, or do other activities. · Avoid or limit alcohol. Talk to your doctor about whether you can drink any alcohol. · Try to limit how much sodium you eat to less than 2,300 milligrams (mg) a day. Your doctor may ask you to try to eat less than 1,500 mg a day. · Eat plenty of fruits (such as bananas and oranges), vegetables, legumes, whole grains, and low-fat dairy products. · Lower the amount of saturated fat in your diet. Saturated fat is found in animal products such as milk, cheese, and meat. Limiting these foods may help you lose weight and also lower your risk for heart disease. · Do not smoke. Smoking increases your risk for heart attack and stroke. If you need help quitting, talk to your doctor about stop-smoking programs and medicines. These can increase your chances of quitting for good. When should you call for help? Call 911  anytime you think you may need emergency care. This may mean having symptoms that suggest that your blood pressure is causing a serious heart or blood vessel problem. Your blood pressure may be over 180/120. For example, call 911 if:    · You have symptoms of a heart attack. These may include:  ? Chest pain or pressure, or a strange feeling in the chest.  ? Sweating. ? Shortness of breath. ? Nausea or vomiting. ? Pain, pressure, or a strange feeling in the back, neck, jaw, or upper belly or in one or both shoulders or arms. ? Lightheadedness or sudden weakness.   ? A fast or irregular heartbeat.     · You have symptoms of a stroke. These may include:  ? Sudden numbness, tingling, weakness, or loss of movement in your face, arm, or leg, especially on only one side of your body. ? Sudden vision changes. ? Sudden trouble speaking. ? Sudden confusion or trouble understanding simple statements. ? Sudden problems with walking or balance. ? A sudden, severe headache that is different from past headaches.     · You have severe back or belly pain. Do not wait until your blood pressure comes down on its own. Get help right away. Call your doctor now or seek immediate care if:    · Your blood pressure is much higher than normal (such as 180/120 or higher), but you don't have symptoms.     · You think high blood pressure is causing symptoms, such as:  ? Severe headache.  ? Blurry vision. Watch closely for changes in your health, and be sure to contact your doctor if:    · Your blood pressure measures higher than your doctor recommends at least 2 times. That means the top number is higher or the bottom number is higher, or both.     · You think you may be having side effects from your blood pressure medicine. Where can you learn more? Go to http://www.gray.com/  Enter [id number] in the search box to learn more about \"High Blood Pressure: Care Instructions. \"  Current as of: January 10, 2022               Content Version: 13.2  © 2006-2022 [employer]. Care instructions adapted under license by [employer] (which disclaims liability or warranty for this information). If you have questions about a medical condition or this instruction, always ask your healthcare professional. [place] [address] any warranty or liability for your use of this information.

## 2022-06-06 NOTE — PROGRESS NOTES
Chief Complaint   Patient presents with    Establish Care    Hypertension       HPI:     is a 62 y.o. male who presents as a new patient for ED follow-up. He is  with two young adult children; he is the  at [employer]. HTN:  He was seen in the ED at [facility] on 5/25/22 for elevated BP; he notes that he did not normally check his BP, but did that day because he felt \"not right. \"  Denies CP, palpitations, SOB, NOBLE; notes hx costochondritis and GERD and has had sharp, burning pain in his chest several times over the last 10+ years. Was started on amlodipine in the ED and has been compliant with this; no medication SEs. Home BPs have been 140s/80-90s. HLD:  Diet controlled; notes that he eats very little red meat, mostly chicken, fish, turkey, but does admit to eating a lot of carb-heavy foods. Does not exercise regularly. Notes that he snores; had home sleep study several years ago without concern for LUCY. Reports that he usually awakens feeling fatigued, but attributes this to late bedtimes and early wake times; notes that he feels rested on the weekends when he is able to sleep in. Allergies   Allergen Reactions    Demerol [Meperidine] Anaphylaxis and Other (comments)     Caused BP To Drop.  Aleve [Naproxen Sodium] Hives    Phenergan [Promethazine] Unknown (comments)       Current Outpatient Medications   Medication Sig    amLODIPine (NORVASC) 10 mg tablet Take 1 Tablet by mouth daily.  KRILL OIL PO Take 1 Cap by mouth daily.  omeprazole (PRILOSEC) 20 mg capsule Take 20 mg by mouth daily.  multivitamin (ONE A DAY) tablet Take 1 Tablet by mouth daily as needed.  fluticasone (FLONASE) 50 mcg/actuation nasal spray 2 sprays by Both Nostrils route daily. No current facility-administered medications for this visit.        Past Medical History:   Diagnosis Date    Anxiety     Costochondritis     Diverticulosis     GERD (gastroesophageal reflux disease)  Hypertension     Rhinitis, allergic        Family History   Problem Relation Age of Onset    Cancer Mother 48        colon    Diabetes Father     Arrhythmia Father         a fib    Heart Disease Father     OSTEOARTHRITIS Sister     COPD Maternal Grandfather     Heart Disease Paternal Grandmother     Stroke Paternal Grandmother     Cancer Paternal Grandfather         Head/Neck--smoker       Review of Systems   Constitutional: Negative. Negative for chills, fever and malaise/fatigue. HENT: Negative. Eyes: Negative. Respiratory: Negative. Negative for cough and shortness of breath. Cardiovascular: Negative. Negative for chest pain, palpitations and leg swelling. Gastrointestinal: Negative. Negative for abdominal pain, nausea and vomiting. Genitourinary: Negative. Musculoskeletal: Negative. Skin: Negative. Neurological: Negative. Negative for dizziness and headaches. Endo/Heme/Allergies: Negative. Psychiatric/Behavioral: Negative. Negative for depression. The patient is not nervous/anxious. BP (!) 146/84 (BP 1 Location: Left upper arm, BP Patient Position: Sitting, BP Cuff Size: Large adult)   Pulse 97   Temp 98 °F (36.7 °C) (Temporal)   Resp 18   Ht 5' 8\" (1.727 m)   Wt 232 lb (105.2 kg)   SpO2 97%   BMI 35.28 kg/m²     Wt Readings from Last 3 Encounters:   06/06/22 232 lb (105.2 kg)   05/25/22 230 lb (104.3 kg)   06/07/16 230 lb (104.3 kg)       3 most recent PHQ Screens 6/6/2022   Little interest or pleasure in doing things Not at all   Feeling down, depressed, irritable, or hopeless Not at all   Total Score PHQ 2 0       Physical Exam  Vitals and nursing note reviewed. Constitutional:       General: He is not in acute distress. Appearance: Normal appearance. He is obese. HENT:      Head: Normocephalic and atraumatic. Mouth/Throat:      Mouth: Mucous membranes are moist.      Pharynx: Oropharynx is clear.    Eyes:      Extraocular Movements: Extraocular movements intact. Conjunctiva/sclera: Conjunctivae normal.      Pupils: Pupils are equal, round, and reactive to light. Neck:      Vascular: No carotid bruit. Cardiovascular:      Rate and Rhythm: Normal rate and regular rhythm. Pulses: Normal pulses. Heart sounds: Normal heart sounds. No murmur heard. No friction rub. No gallop. Pulmonary:      Effort: Pulmonary effort is normal.      Breath sounds: Normal breath sounds. No wheezing, rhonchi or rales. Abdominal:      General: Bowel sounds are normal.      Palpations: Abdomen is soft. Musculoskeletal:         General: Normal range of motion. Cervical back: Normal range of motion and neck supple. Lymphadenopathy:      Cervical: No cervical adenopathy. Skin:     General: Skin is warm and dry. Neurological:      General: No focal deficit present. Mental Status: He is alert and oriented to person, place, and time. Psychiatric:         Mood and Affect: Mood normal.         Behavior: Behavior normal.         Thought Content: Thought content normal.         Judgment: Judgment normal.       ASSESSMENT AND PLAN:       ICD-10-CM ICD-9-CM    1. Encounter to establish care  Z76.89 V65.8    2. Primary hypertension  I10 401.9 COLLECTION VENOUS BLOOD,VENIPUNCTURE      amLODIPine (NORVASC) 10 mg tablet   3. Class 2 severe obesity due to excess calories with serious comorbidity and body mass index (BMI) of 35.0 to 35.9 in adult ([facility])  E66.01 278.01 COLLECTION VENOUS BLOOD,VENIPUNCTURE    Z68.35 V85.35 LIPID PANEL      LIPID PANEL   4. Elevated glucose  R73.09 790.29 COLLECTION VENOUS BLOOD,VENIPUNCTURE      HEMOGLOBIN A1C WITH EAG      HEMOGLOBIN A1C WITH EAG   5. Prostate cancer screening  Z12.5 V76.44 PSA SCREENING (SCREENING)      PSA SCREENING (SCREENING)       BP above goal today; increase amlodipine dose and recheck in 2 weeks. Discussed the patient's BMI with Him.   The BMI follow up plan is as follows:    Dietary management education, guidance, and counseling. Recommended avoid etoh and sugary drinks, pushing water. Consume at least six servings of fruits and vegetables daily. [place], broil, or bake your protein. Recommend exercising for at least 30 minutes 5-7 days per week. Monitor weight. Medication Side Effects and Warnings were discussed with patient:  yes  Patient Labs were reviewed:  yes  Patient Past Records were reviewed:  yes      Patient aware of plan of care and verbalized understanding. Questions answered. RTC 2 weeks or sooner if needed. On this date 06/06/2022 I have spent 45 minutes reviewing previous notes, test results and face to face with the patient discussing the diagnosis and importance of compliance with the treatment plan as well as documenting on the day of the visit.     Herber Barraza NP

## 2022-06-06 NOTE — PROGRESS NOTES
Identified pt with two pt identifiers(name and ). Reviewed record in preparation for visit and have obtained necessary documentation. Chief Complaint   Patient presents with    Establish Care    Hypertension      Vitals:    22 1452   BP: (!) 146/84   Pulse: 97   Resp: 18   Temp: 98 °F (36.7 °C)   TempSrc: Temporal   SpO2: 97%   Weight: 232 lb (105.2 kg)   Height: 5' 8\" (1.727 m)   PainSc:   0 - No pain       Coordination of Care Questionnaire:  :       1. \"Have you been to the ER, urgent care clinic since your last visit? Hospitalized since your last visit? \" Yes When: 22 HTN, [facility]    2. \"Have you seen or consulted any other health care providers outside of the [address] [place] [address] since your last visit? \" No     3. For patients aged 39-70: Has the patient had a colonoscopy / FIT/ Cologuard? Yes - no Care Gap present; every 3 yrs, Mom passed at age 46 w/colon cancer, had polyp, gets them every 3yrs       If the patient is female:    4. For patients aged 41-77: Has the patient had a mammogram within the past 2 years? NA - based on age or sex      11. For patients aged 21-65: Has the patient had a pap smear?  NA - based on age or sex

## 2022-06-07 LAB
CHOLEST SERPL-MCNC: 209 MG/DL
EST. AVERAGE GLUCOSE BLD GHB EST-MCNC: 192 MG/DL
HBA1C MFR BLD: 8.3 % (ref 4–5.6)
HDLC SERPL-MCNC: 50 MG/DL
HDLC SERPL: 4.2 {RATIO} (ref 0–5)
LDLC SERPL CALC-MCNC: 119 MG/DL (ref 0–100)
PSA SERPL-MCNC: 0.2 NG/ML (ref 0.01–4)
TRIGL SERPL-MCNC: 200 MG/DL (ref ?–150)
VLDLC SERPL CALC-MCNC: 40 MG/DL

## 2022-06-07 NOTE — PROGRESS NOTES
Your A1C, a diabetes marker, is elevated at 8.3; this is well within the diabetes range. I recommend that we start you on medication to help control your blood sugar. We can talk about this more when you return in 2 weeks to recheck your blood pressure. Your \"bad\" cholesterol is elevated; based on your other risk factors, I would recommend that we start you on statin medication to help reduce your risk for heart disease and stroke. Your prostate screening test is normal--no concerns.

## 2022-06-10 ENCOUNTER — TELEPHONE (OUTPATIENT)
Dept: FAMILY MEDICINE CLINIC | Age: 57
End: 2022-06-10

## 2022-06-10 NOTE — TELEPHONE ENCOUNTER
Pr returned call regarding lab results. He states that he is out of town and when he returns he will call in to set up a follow up appt to discuss results and recommendations further.  MIGUEL

## 2022-06-20 ENCOUNTER — OFFICE VISIT (OUTPATIENT)
Dept: FAMILY MEDICINE CLINIC | Age: 57
End: 2022-06-20
Payer: COMMERCIAL

## 2022-06-20 VITALS
TEMPERATURE: 97.8 F | HEART RATE: 70 BPM | WEIGHT: 226.2 LBS | OXYGEN SATURATION: 99 % | RESPIRATION RATE: 20 BRPM | SYSTOLIC BLOOD PRESSURE: 119 MMHG | DIASTOLIC BLOOD PRESSURE: 70 MMHG | HEIGHT: 67 IN | BODY MASS INDEX: 35.5 KG/M2

## 2022-06-20 DIAGNOSIS — E11.9 CONTROLLED TYPE 2 DIABETES MELLITUS WITHOUT COMPLICATION, WITHOUT LONG-TERM CURRENT USE OF INSULIN (HCC): ICD-10-CM

## 2022-06-20 DIAGNOSIS — I10 PRIMARY HYPERTENSION: Primary | ICD-10-CM

## 2022-06-20 DIAGNOSIS — E78.2 MIXED HYPERLIPIDEMIA: ICD-10-CM

## 2022-06-20 DIAGNOSIS — E66.01 CLASS 2 SEVERE OBESITY DUE TO EXCESS CALORIES WITH SERIOUS COMORBIDITY AND BODY MASS INDEX (BMI) OF 35.0 TO 35.9 IN ADULT (HCC): ICD-10-CM

## 2022-06-20 PROCEDURE — 3052F HG A1C>EQUAL 8.0%<EQUAL 9.0%: CPT

## 2022-06-20 PROCEDURE — 99214 OFFICE O/P EST MOD 30 MIN: CPT

## 2022-06-20 RX ORDER — ATORVASTATIN CALCIUM 40 MG/1
40 TABLET, FILM COATED ORAL DAILY
Qty: 90 TABLET | Refills: 3 | Status: SHIPPED | OUTPATIENT
Start: 2022-06-20

## 2022-06-20 RX ORDER — METFORMIN HYDROCHLORIDE 500 MG/1
500 TABLET ORAL 2 TIMES DAILY WITH MEALS
Qty: 360 TABLET | Refills: 0 | Status: SHIPPED | OUTPATIENT
Start: 2022-06-20

## 2022-06-20 RX ORDER — LISINOPRIL 5 MG/1
5 TABLET ORAL DAILY
Qty: 90 TABLET | Refills: 0 | Status: CANCELLED | OUTPATIENT
Start: 2022-06-20

## 2022-06-20 NOTE — PROGRESS NOTES
Chief Complaint   Patient presents with    Follow-up     lab wk results    Diabetes    Hypertension       HPI:     is a 62 y.o. male who presents for 2-week follow-up. HTN:  Seen in ED on 5/25 for elevated BP and started on amlodipine at that time; continued to be elevated on follow-up 2 weeks ago and amlodipine dose was increased. He has been compliant with the medication and denies SEs; home BP 120s/70-80s. DM:  A1C noted to be elevated at 8.3 on last visit; no previous history, but has strong familial hx. Has started walking daily for exercise and is making some dietary changes. He is interested in more intense diabetes education to prevent worsening disease. HLD:  Previously noted to be elevated. Notes that he switched from fried foods to baked foods years ago. Allergies   Allergen Reactions    Demerol [Meperidine] Anaphylaxis and Other (comments)     Caused BP To Drop.  Aleve [Naproxen Sodium] Hives    Phenergan [Promethazine] Unknown (comments)       Current Outpatient Medications   Medication Sig    metFORMIN (GLUCOPHAGE) 500 mg tablet Take 1 Tablet by mouth two (2) times daily (with meals).  atorvastatin (LIPITOR) 40 mg tablet Take 1 Tablet by mouth daily.  amLODIPine (NORVASC) 10 mg tablet Take 1 Tablet by mouth daily.  KRILL OIL PO Take 1 Cap by mouth daily.  omeprazole (PRILOSEC) 20 mg capsule Take 20 mg by mouth daily.  fluticasone (FLONASE) 50 mcg/actuation nasal spray 2 sprays by Both Nostrils route daily.  multivitamin (ONE A DAY) tablet Take 1 Tablet by mouth daily as needed. No current facility-administered medications for this visit.        Past Medical History:   Diagnosis Date    Anxiety     Costochondritis     Diverticulosis     GERD (gastroesophageal reflux disease)     Hypertension     Rhinitis, allergic        Family History   Problem Relation Age of Onset    Cancer Mother 48        colon    Diabetes Father     Arrhythmia Father         a fib    Heart Disease Father     OSTEOARTHRITIS Sister     COPD Maternal Grandfather     Heart Disease Paternal Grandmother     Stroke Paternal Grandmother     Cancer Paternal Grandfather         Head/Neck--smoker       Review of Systems   Constitutional: Negative for chills, fever and malaise/fatigue. Respiratory: Negative for cough and shortness of breath. Cardiovascular: Negative for chest pain, palpitations and leg swelling. Gastrointestinal: Negative. Musculoskeletal: Negative. Neurological: Negative. Negative for dizziness and headaches. Psychiatric/Behavioral: Negative. Negative for depression. The patient is not nervous/anxious. /70 (BP 1 Location: Right arm, BP Patient Position: Sitting, BP Cuff Size: Large adult)   Pulse 70   Temp 97.8 °F (36.6 °C) (Core)   Resp 20   Ht 5' 6.5\" (1.689 m)   Wt 226 lb 3.2 oz (102.6 kg)   SpO2 99%   BMI 35.96 kg/m²     Wt Readings from Last 3 Encounters:   06/20/22 226 lb 3.2 oz (102.6 kg)   06/06/22 232 lb (105.2 kg)   05/25/22 230 lb (104.3 kg)       3 most recent PHQ Screens 6/20/2022   Little interest or pleasure in doing things Not at all   Feeling down, depressed, irritable, or hopeless Not at all   Total Score PHQ 2 0       Physical Exam  Constitutional:       Appearance: Normal appearance. He is obese. HENT:      Head: Normocephalic and atraumatic. Eyes:      Extraocular Movements: Extraocular movements intact. Conjunctiva/sclera: Conjunctivae normal.      Pupils: Pupils are equal, round, and reactive to light. Cardiovascular:      Rate and Rhythm: Normal rate and regular rhythm. Pulses: Normal pulses. Heart sounds: Normal heart sounds. No murmur heard. No friction rub. No gallop. Pulmonary:      Effort: Pulmonary effort is normal.      Breath sounds: Normal breath sounds. No wheezing, rhonchi or rales. Musculoskeletal:         General: Normal range of motion.       Cervical back: Normal range of motion and neck supple. Skin:     General: Skin is warm and dry. Neurological:      General: No focal deficit present. Mental Status: He is alert and oriented to person, place, and time. Psychiatric:         Mood and Affect: Mood normal.         Behavior: Behavior normal.         Thought Content: Thought content normal.         Judgment: Judgment normal.         ASSESSMENT AND PLAN:       ICD-10-CM ICD-9-CM    1. Primary hypertension  I10 401.9    2. Controlled type 2 diabetes mellitus without complication, without long-term current use of insulin (HCC)  E11.9 250.00 metFORMIN (GLUCOPHAGE) 500 mg tablet      atorvastatin (LIPITOR) 40 mg tablet       DIABETES EDUCATION   3. Mixed hyperlipidemia  E78.2 272.2 atorvastatin (LIPITOR) 40 mg tablet   4. Class 2 severe obesity due to excess calories with serious comorbidity and body mass index (BMI) of 35.0 to 35.9 in adult [facility])  E66.01 278.01     Z68.35 V85.35        Normotensive today; continue amlodipine at current dose. Add metformin and atorvastatin; lengthy discussion regarding MOA and SEs. Continue to work on lifestyle changes; refer to diabetes educator. Medication Side Effects and Warnings were discussed with patient:  yes  Patient Labs were reviewed:  yes  Patient Past Records were reviewed:  yes      Patient aware of plan of care and verbalized understanding. Questions answered. RTC 3 months or sooner if needed. On this date 06/20/2022 I have spent 30 minutes reviewing previous notes, test results and face to face with the patient discussing the diagnosis and importance of compliance with the treatment plan as well as documenting on the day of the visit.     Alicia Orantes NP

## 2022-06-20 NOTE — PROGRESS NOTES
1. \"Have you been to the ER, urgent care clinic since your last visit? No  Hospitalized since your last visit? \" No    2. \"Have you seen or consulted any other health care providers outside of the [address] [place] [address] since your last visit? \" No     3. For patients aged 39-70: Has the patient had a colonoscopy / FIT/ Cologuard? Yes - no Care Gap present      If the patient is female:    4. For patients aged 41-77: Has the patient had a mammogram within the past 2 years? NA - based on age or sex      11. For patients aged 21-65: Has the patient had a pap smear?  NA - based on age or sex

## 2022-06-20 NOTE — PATIENT INSTRUCTIONS
Learning About Meal Planning for Diabetes  Why plan your meals? Meal planning can be a key part of managing diabetes. Planning meals and snacks with the right balance of carbohydrate, protein, and fat can help you keep your blood sugar at the target level you set with your doctor. You don't have to eat special foods. You can eat what your family eats, including sweets once in a while. But you do have to pay attention to how often you eat and how much you eat of certain foods. You may want to work with a dietitian or a diabetes educator. They can give you tips and meal ideas and can answer your questions about meal planning. This health professional can also help you reach a healthy weight if that is one of your goals. What plan is right for you? Your dietitian or diabetes educator may suggest that you start with the plate format or carbohydrate counting. The plate format  The plate format is a simple way to help you manage how you eat. You plan meals by learning how much space each food should take on a plate. Using the plate format helps you manage the amount of carbohydrate you eat. It can make it easier to keep your blood sugar level within your target range. It also helps you see if you're eating healthy portion sizes. To use the plate format, you put non-starchy vegetables on half your plate. Add lean protein foods, such as fish, lean meats and poultry, or soy products, on one-quarter of the plate. Put a grain or starchy vegetable (such as brown rice or a potato) on the final quarter of the plate. You can add a small piece of fruit and some low-fat or fat-free milk or yogurt, depending on your carbohydrate goal for each meal.  Here are some tips for using the plate format:  · Make sure that you are not using an oversized plate. A 9-inch plate is best. Many restaurants use larger plates. · Get used to using the plate format at home. Then you can use it when you eat out.   · Write down your questions about using the plate format. Talk to your doctor, a dietitian, or a diabetes educator about your concerns. Carbohydrate counting  With carbohydrate counting, you plan meals based on the amount of carbohydrate in each food. Carbohydrate raises blood sugar higher and more quickly than any other nutrient. It is found in desserts, breads and cereals, and fruit. It's also found in starchy vegetables such as potatoes and corn, grains such as rice and pasta, and milk and yogurt. You can help keep your blood sugar levels within your target range by planning how much carbohydrate to have at meals and snacks. The amount you need depends on several things. These include your weight, how active you are, which diabetes medicines you take, and what your goals are for your blood sugar levels. A registered dietitian or diabetes educator can help you plan how much carbohydrate to include in each meal and snack. An example of a carbohydrate counting plan is:  · 45 to 60 grams at each meal. That's about the same as 3 to 4 carbohydrate servings. · 15 to 20 grams at each snack. That's about the same as 1 carbohydrate serving. The Nutrition Facts label on packaged foods tells you how much carbohydrate is in a serving of the food. First, look at the serving size on the food label. Is that the amount you eat in a serving? All of the nutrition information on a food label is based on that serving size. So if you eat more or less than that, you'll need to adjust the other numbers. Total carbohydrate is the next thing you need to look for on the label. If you count carbohydrate servings, one serving of carbohydrate is 15 grams. For foods that don't come with labels, such as fresh fruits and vegetables, you'll need a guide that lists carbohydrate in these foods. Ask your doctor, dietitian, or diabetes educator about books or other nutrition guides you can use.   If you take insulin, you need to know how many grams of carbohydrate are in a meal. This lets you know how much rapid-acting insulin to take before you eat. If you use an insulin pump, you get a constant rate of insulin during the day. So the pump must be programmed at meals to give you extra insulin to cover the rise in blood sugar after meals. When you know how much carbohydrate you will eat, you can take the right amount of insulin. Or, if you always use the same amount of insulin, you need to make sure that you eat the same amount of carbohydrate at meals. If you need more help to understand carbohydrate counting and food labels, ask your doctor, dietitian, or diabetes educator. How can you plan healthy meals? Here are some tips to get started:  · Plan your meals a week at a time. Don't forget to include snacks too. · Use cookbooks or online recipes to plan several main meals. Plan some quick meals for busy nights. You also can double some recipes that freeze well. Then you can save half for other busy nights when you don't have time to cook. · Make sure you have the ingredients you need for your recipes. If you're running low on basic items, put these items on your shopping list too. · List foods that you use to make breakfasts, lunches, and snacks. List plenty of fruits and vegetables. · Post this list on the refrigerator. Add to it as you think of more things you need. · Take the list to the store to do your weekly shopping. Follow-up care is a key part of your treatment and safety. Be sure to make and go to all appointments, and call your doctor if you are having problems. It's also a good idea to know your test results and keep a list of the medicines you take. Where can you learn more? Go to http://www.gray.com/  Enter W475 in the search box to learn more about \"Learning About Meal Planning for Diabetes. \"  Current as of: September 8, 2021               Content Version: 13.2  © 0415-0746 Healthwise, [facility].    Care instructions adapted under license by [employer] (which disclaims liability or warranty for this information). If you have questions about a medical condition or this instruction, always ask your healthcare professional. [address] any warranty or liability for your use of this information.

## 2022-07-02 ENCOUNTER — PATIENT MESSAGE (OUTPATIENT)
Dept: FAMILY MEDICINE CLINIC | Age: 57
End: 2022-07-02

## 2022-07-02 DIAGNOSIS — I10 PRIMARY HYPERTENSION: ICD-10-CM

## 2022-07-05 RX ORDER — AMLODIPINE BESYLATE 10 MG/1
10 TABLET ORAL DAILY
Qty: 90 TABLET | Refills: 3 | Status: SHIPPED | OUTPATIENT
Start: 2022-07-05

## 2022-10-07 ENCOUNTER — OFFICE VISIT (OUTPATIENT)
Dept: FAMILY MEDICINE CLINIC | Age: 57
End: 2022-10-07
Payer: COMMERCIAL

## 2022-10-07 VITALS
HEIGHT: 67 IN | HEART RATE: 74 BPM | RESPIRATION RATE: 20 BRPM | SYSTOLIC BLOOD PRESSURE: 130 MMHG | BODY MASS INDEX: 32.9 KG/M2 | TEMPERATURE: 97.8 F | WEIGHT: 209.6 LBS | DIASTOLIC BLOOD PRESSURE: 70 MMHG | OXYGEN SATURATION: 97 %

## 2022-10-07 DIAGNOSIS — E11.9 CONTROLLED TYPE 2 DIABETES MELLITUS WITHOUT COMPLICATION, WITHOUT LONG-TERM CURRENT USE OF INSULIN (HCC): Primary | ICD-10-CM

## 2022-10-07 DIAGNOSIS — I10 PRIMARY HYPERTENSION: ICD-10-CM

## 2022-10-07 DIAGNOSIS — E78.2 MIXED HYPERLIPIDEMIA: ICD-10-CM

## 2022-10-07 LAB — HBA1C MFR BLD HPLC: 5.9 %

## 2022-10-07 PROCEDURE — 99214 OFFICE O/P EST MOD 30 MIN: CPT

## 2022-10-07 PROCEDURE — 3052F HG A1C>EQUAL 8.0%<EQUAL 9.0%: CPT

## 2022-10-07 PROCEDURE — 83036 HEMOGLOBIN GLYCOSYLATED A1C: CPT

## 2022-10-07 PROCEDURE — 36415 COLL VENOUS BLD VENIPUNCTURE: CPT

## 2022-10-07 NOTE — PROGRESS NOTES
Lab BW done successfully via Lt arm venipuncture, 1 SST and 1 urine specimen sent and 1 lavender for POC testing. Pt tolerated well.

## 2022-10-07 NOTE — PROGRESS NOTES
Chief Complaint   Patient presents with    Follow-up     HTN       HPI:     is a 62 y.o. male who presents for chronic follow-up. T2DM:  Diagnosed earlier this ear; compliant with metformin without SEs. Has dramatically changed his diet and has been exercising most days; weight is down 17 pounds. A1C 8.3. HTN:  Well-controlled on amlodipine; home -140s/70-80s. HLD:  Compliant with atorvastatin. Allergies   Allergen Reactions    Demerol [Meperidine] Anaphylaxis and Other (comments)     Caused BP To Drop. Aleve [Naproxen Sodium] Hives    Phenergan [Promethazine] Unknown (comments)       Current Outpatient Medications   Medication Sig    amLODIPine (NORVASC) 10 mg tablet Take 1 Tablet by mouth daily. metFORMIN (GLUCOPHAGE) 500 mg tablet Take 1 Tablet by mouth two (2) times daily (with meals). atorvastatin (LIPITOR) 40 mg tablet Take 1 Tablet by mouth daily. KRILL OIL PO Take 1 Cap by mouth daily. omeprazole (PRILOSEC) 20 mg capsule Take 20 mg by mouth daily. fluticasone (FLONASE) 50 mcg/actuation nasal spray 2 sprays by Both Nostrils route daily. multivitamin (ONE A DAY) tablet Take 1 Tablet by mouth daily as needed. No current facility-administered medications for this visit. Past Medical History:   Diagnosis Date    Anxiety     Costochondritis     Diverticulosis     GERD (gastroesophageal reflux disease)     Hypertension     Rhinitis, allergic        Family History   Problem Relation Age of Onset    Cancer Mother 48        colon    Diabetes Father     Arrhythmia Father         a fib    Heart Disease Father     OSTEOARTHRITIS Sister     COPD Maternal Grandfather     Heart Disease Paternal Grandmother     Stroke Paternal Grandmother     Cancer Paternal Grandfather         Head/Neck--smoker       Review of Systems   Constitutional: Negative. Negative for chills, fever and malaise/fatigue. HENT: Negative. Eyes: Negative. Respiratory: Negative. Negative for cough and shortness of breath. Cardiovascular: Negative. Negative for chest pain, palpitations and leg swelling. Gastrointestinal: Negative. Negative for abdominal pain, nausea and vomiting. Genitourinary: Negative. Musculoskeletal: Negative. Skin: Negative. Neurological: Negative. Negative for dizziness and headaches. Endo/Heme/Allergies: Negative. Psychiatric/Behavioral: Negative. Negative for depression. The patient is not nervous/anxious. /70 (BP 1 Location: Left arm, BP Patient Position: Sitting, BP Cuff Size: Large adult)   Pulse 74   Temp 97.8 °F (36.6 °C) (Temporal)   Resp 20   Ht 5' 6.5\" (1.689 m)   Wt 209 lb 9.6 oz (95.1 kg)   SpO2 97%   BMI 33.32 kg/m²     Wt Readings from Last 3 Encounters:   10/07/22 209 lb 9.6 oz (95.1 kg)   06/20/22 226 lb 3.2 oz (102.6 kg)   06/06/22 232 lb (105.2 kg)       3 most recent PHQ Screens 10/7/2022   Little interest or pleasure in doing things Not at all   Feeling down, depressed, irritable, or hopeless Not at all   Total Score PHQ 2 0         Physical Exam  Vitals and nursing note reviewed. Constitutional:       General: He is not in acute distress. Appearance: Normal appearance. He is obese. HENT:      Head: Normocephalic and atraumatic. Mouth/Throat:      Mouth: Mucous membranes are moist.      Pharynx: Oropharynx is clear. Eyes:      Extraocular Movements: Extraocular movements intact. Conjunctiva/sclera: Conjunctivae normal.      Pupils: Pupils are equal, round, and reactive to light. Cardiovascular:      Rate and Rhythm: Normal rate and regular rhythm. Pulses: Normal pulses. Heart sounds: Normal heart sounds. No murmur heard. No friction rub. No gallop. Pulmonary:      Effort: Pulmonary effort is normal.      Breath sounds: Normal breath sounds. No wheezing, rhonchi or rales. Abdominal:      General: Bowel sounds are normal.      Palpations: Abdomen is soft. Musculoskeletal:         General: Normal range of motion. Skin:     General: Skin is warm and dry. Neurological:      General: No focal deficit present. Mental Status: He is alert and oriented to person, place, and time. Psychiatric:         Mood and Affect: Mood normal.         Behavior: Behavior normal.         Thought Content: Thought content normal.         Judgment: Judgment normal.       ASSESSMENT AND PLAN:       ICD-10-CM ICD-9-CM    1. Controlled type 2 diabetes mellitus without complication, without long-term current use of insulin (HCC)  E11.9 250.00 COLLECTION VENOUS BLOOD,VENIPUNCTURE      AMB POC HEMOGLOBIN A1C      MICROALBUMIN, UR, RAND W/ MICROALB/CREAT RATIO      2. Mixed hyperlipidemia  E78.2 272.2 COLLECTION VENOUS BLOOD,VENIPUNCTURE      LIPID PANEL      3. Primary hypertension  I10 401.9           A1C down to 5.9; keep up the great work. Continue to follow heart healthy diet, eat whole grains, lean meats, and plenty of fruits and veggies--avoiding concentrated sweets and saturated fats. Medication Side Effects and Warnings were discussed with patient:  yes  Patient Labs were reviewed:  yes  Patient Past Records were reviewed:  yes      Patient aware of plan of care and verbalized understanding. Questions answered. RTC 3 months or sooner if needed. On this date 10/07/2022 I have spent 30 minutes reviewing previous notes, test results and face to face with the patient discussing the diagnosis and importance of compliance with the treatment plan as well as documenting on the day of the visit.     Stephan Ibrahim NP

## 2022-10-07 NOTE — PROGRESS NOTES
Chief Complaint   Patient presents with    Follow-up     HTN     1. \"Have you been to the ER, urgent care clinic since your last visit? No  Hospitalized since your last visit? \" No    2. \"Have you seen or consulted any other health care providers outside of the [address] [place] [address] since your last visit? \" No     3. For patients aged 39-70: Has the patient had a colonoscopy / FIT/ Cologuard? Yes - no Care Gap present      If the patient is female:    4. For patients aged 41-77: Has the patient had a mammogram within the past 2 years? NA - based on age or sex      11. For patients aged 21-65: Has the patient had a pap smear?  NA - based on age or sex

## 2022-10-08 LAB
CHOLEST SERPL-MCNC: 105 MG/DL
CREAT UR-MCNC: 24.4 MG/DL
HDLC SERPL-MCNC: 50 MG/DL
HDLC SERPL: 2.1 {RATIO} (ref 0–5)
LDLC SERPL CALC-MCNC: 33 MG/DL (ref 0–100)
MICROALBUMIN UR-MCNC: <0.5 MG/DL
MICROALBUMIN/CREAT UR-RTO: <20 MG/G (ref 0–30)
TRIGL SERPL-MCNC: 110 MG/DL (ref ?–150)
VLDLC SERPL CALC-MCNC: 22 MG/DL

## 2022-12-26 DIAGNOSIS — E11.9 CONTROLLED TYPE 2 DIABETES MELLITUS WITHOUT COMPLICATION, WITHOUT LONG-TERM CURRENT USE OF INSULIN (HCC): ICD-10-CM

## 2022-12-27 RX ORDER — METFORMIN HYDROCHLORIDE 500 MG/1
500 TABLET ORAL 2 TIMES DAILY WITH MEALS
Qty: 360 TABLET | Refills: 0 | Status: SHIPPED | OUTPATIENT
Start: 2022-12-27

## 2023-01-04 ENCOUNTER — OFFICE VISIT (OUTPATIENT)
Dept: FAMILY MEDICINE CLINIC | Age: 58
End: 2023-01-04
Payer: COMMERCIAL

## 2023-01-04 VITALS
HEART RATE: 74 BPM | TEMPERATURE: 98.5 F | RESPIRATION RATE: 20 BRPM | WEIGHT: 209 LBS | SYSTOLIC BLOOD PRESSURE: 130 MMHG | DIASTOLIC BLOOD PRESSURE: 78 MMHG | HEIGHT: 68 IN | BODY MASS INDEX: 31.67 KG/M2 | OXYGEN SATURATION: 98 %

## 2023-01-04 DIAGNOSIS — E78.2 MIXED HYPERLIPIDEMIA: ICD-10-CM

## 2023-01-04 DIAGNOSIS — Z23 ENCOUNTER FOR IMMUNIZATION: ICD-10-CM

## 2023-01-04 DIAGNOSIS — I10 PRIMARY HYPERTENSION: ICD-10-CM

## 2023-01-04 DIAGNOSIS — E11.9 CONTROLLED TYPE 2 DIABETES MELLITUS WITHOUT COMPLICATION, WITHOUT LONG-TERM CURRENT USE OF INSULIN (HCC): Primary | ICD-10-CM

## 2023-01-04 LAB — HBA1C MFR BLD HPLC: 5.7 %

## 2023-01-04 PROCEDURE — 83036 HEMOGLOBIN GLYCOSYLATED A1C: CPT

## 2023-01-04 PROCEDURE — 99214 OFFICE O/P EST MOD 30 MIN: CPT

## 2023-01-04 PROCEDURE — 90471 IMMUNIZATION ADMIN: CPT

## 2023-01-04 PROCEDURE — 3078F DIAST BP <80 MM HG: CPT

## 2023-01-04 PROCEDURE — 3044F HG A1C LEVEL LT 7.0%: CPT

## 2023-01-04 PROCEDURE — 90686 IIV4 VACC NO PRSV 0.5 ML IM: CPT

## 2023-01-04 PROCEDURE — 3075F SYST BP GE 130 - 139MM HG: CPT

## 2023-01-04 NOTE — PROGRESS NOTES
Chief Complaint   Patient presents with    Follow-up     For diabetes       HPI:     is a 62 y.o. male who presents for chronic follow-up. T2DM:  Diagnosed earlier this year; compliant with metformin without SEs. Has dramatically changed his diet and has been exercising most days; weight is down 17 pounds. A1C 8.3-->5.9%. HTN:  Well-controlled on amlodipine; denies CP, HA, SOB, palpations, dizziness. Notes some dependent edema in his ankles, worse over the last couple of months. HLD:  Compliant with atorvastatin. Allergies   Allergen Reactions    Demerol [Meperidine] Anaphylaxis and Other (comments)     Caused BP To Drop. Aleve [Naproxen Sodium] Hives    Phenergan [Promethazine] Unknown (comments)     Patient is not allergic       Current Outpatient Medications   Medication Sig    metFORMIN (GLUCOPHAGE) 500 mg tablet Take 1 Tablet by mouth two (2) times daily (with meals). amLODIPine (NORVASC) 10 mg tablet Take 1 Tablet by mouth daily. atorvastatin (LIPITOR) 40 mg tablet Take 1 Tablet by mouth daily. KRILL OIL PO Take 1 Cap by mouth daily. omeprazole (PRILOSEC) 20 mg capsule Take 20 mg by mouth daily. fluticasone (FLONASE) 50 mcg/actuation nasal spray 2 sprays by Both Nostrils route daily. multivitamin (ONE A DAY) tablet Take 1 Tablet by mouth daily as needed. No current facility-administered medications for this visit.        Past Medical History:   Diagnosis Date    Anxiety     Costochondritis     Diverticulosis     GERD (gastroesophageal reflux disease)     Hypertension     Rhinitis, allergic        Family History   Problem Relation Age of Onset    Cancer Mother 48        colon    Diabetes Father     Arrhythmia Father         a fib    Heart Disease Father     OSTEOARTHRITIS Sister     COPD Maternal Grandfather     Heart Disease Paternal Grandmother     Stroke Paternal Grandmother     Cancer Paternal Grandfather         Head/Neck--smoker       Review of Systems Constitutional: Negative. Negative for chills, fever and malaise/fatigue. HENT: Negative. Eyes: Negative. Respiratory: Negative. Negative for cough and shortness of breath. Cardiovascular:  Positive for leg swelling. Negative for chest pain and palpitations. Gastrointestinal: Negative. Negative for abdominal pain, nausea and vomiting. Genitourinary: Negative. Musculoskeletal: Negative. Skin: Negative. Neurological: Negative. Negative for dizziness and headaches. Endo/Heme/Allergies: Negative. Psychiatric/Behavioral: Negative. Negative for depression. The patient is not nervous/anxious. /78 (BP 1 Location: Left upper arm, BP Patient Position: Sitting, BP Cuff Size: Large adult)   Pulse 74   Temp 98.5 °F (36.9 °C) (Oral)   Resp 20   Ht 5' 7.5\" (1.715 m)   Wt 209 lb (94.8 kg)   SpO2 98%   BMI 32.25 kg/m²     Wt Readings from Last 3 Encounters:   01/04/23 209 lb (94.8 kg)   10/07/22 209 lb 9.6 oz (95.1 kg)   06/20/22 226 lb 3.2 oz (102.6 kg)       3 most recent PHQ Screens 10/7/2022   Little interest or pleasure in doing things Not at all   Feeling down, depressed, irritable, or hopeless Not at all   Total Score PHQ 2 0       Physical Exam  Constitutional:       General: He is not in acute distress. Appearance: Normal appearance. He is overweight. HENT:      Head: Normocephalic and atraumatic. Mouth/Throat:      Mouth: Mucous membranes are moist.      Pharynx: Oropharynx is clear. Eyes:      Extraocular Movements: Extraocular movements intact. Conjunctiva/sclera: Conjunctivae normal.      Pupils: Pupils are equal, round, and reactive to light. Cardiovascular:      Rate and Rhythm: Normal rate and regular rhythm. Pulses: Normal pulses. Heart sounds: Normal heart sounds. No murmur heard. No friction rub. No gallop. Pulmonary:      Effort: Pulmonary effort is normal.      Breath sounds: Normal breath sounds.  No wheezing, rhonchi or rales.   Musculoskeletal:         General: Normal range of motion. Skin:     General: Skin is warm and dry. Neurological:      General: No focal deficit present. Mental Status: He is alert and oriented to person, place, and time. Psychiatric:         Mood and Affect: Mood normal.         Behavior: Behavior normal.         Thought Content: Thought content normal.         Judgment: Judgment normal.       ASSESSMENT AND PLAN:       ICD-10-CM ICD-9-CM    1. Controlled type 2 diabetes mellitus without complication, without long-term current use of insulin (HCC)  E11.9 250.00 AMB POC HEMOGLOBIN A1C      COLLECTION CAPILLARY BLOOD SPECIMEN      2. Primary hypertension  I10 401.9       3. Mixed hyperlipidemia  E78.2 272.2       4. Encounter for immunization  Z23 V03.89 LA IMMUNIZ ADMIN,1 SINGLE/COMB VAC/TOXOID      INFLUENZA, FLUARIX, FLULAVAL, FLUZONE (AGE 6 MO+), AFLURIA(AGE 3Y+) IM, PF, 0.5 ML          A1C 5.7% today in office; continue with lifestyle changes, weight loss, dietary changes, physical activity. Flu vaccine today. Medication Side Effects and Warnings were discussed with patient:  yes  Patient Labs were reviewed:  yes  Patient Past Records were reviewed:  yes      Patient aware of plan of care and verbalized understanding. Questions answered. RTC 6 months or sooner if needed. On this date 01/04/2023 I have spent 30 minutes reviewing previous notes, test results and face to face with the patient discussing the diagnosis and importance of compliance with the treatment plan as well as documenting on the day of the visit.     Suad Garcia NP

## 2023-01-04 NOTE — PROGRESS NOTES
1. \"Have you been to the ER, urgent care clinic since your last visit? Hospitalized since your last visit? \" No    2. \"Have you seen or consulted any other health care providers outside of the [address] [place] [address] since your last visit? \" No     3. For patients aged 39-70: Has the patient had a colonoscopy / FIT/ Cologuard? Yes - no Care Gap present      If the patient is female:    4. For patients aged 41-77: Has the patient had a mammogram within the past 2 years? NA - based on age or sex      11. For patients aged 21-65: Has the patient had a pap smear?  NA - based on age or sex    Chief Complaint   Patient presents with    Follow-up     For diabetes     Visit Vitals  /78 (BP 1 Location: Left upper arm, BP Patient Position: Sitting, BP Cuff Size: Large adult)   Pulse 74   Temp 98.5 °F (36.9 °C) (Oral)   Resp 20   Ht 5' 7.5\" (1.715 m)   Wt 209 lb (94.8 kg)   SpO2 98%   BMI 32.25 kg/m²

## 2023-05-19 RX ORDER — ATORVASTATIN CALCIUM 40 MG/1
40 TABLET, FILM COATED ORAL DAILY
COMMUNITY
Start: 2022-06-20

## 2023-05-19 RX ORDER — FLUTICASONE PROPIONATE 50 MCG
2 SPRAY, SUSPENSION (ML) NASAL DAILY
COMMUNITY
Start: 2015-01-12

## 2023-05-19 RX ORDER — OMEPRAZOLE 20 MG/1
20 CAPSULE, DELAYED RELEASE ORAL DAILY
COMMUNITY

## 2023-05-19 RX ORDER — AMLODIPINE BESYLATE 10 MG/1
10 TABLET ORAL DAILY
COMMUNITY
Start: 2022-07-05 | End: 2023-06-20

## 2023-06-20 RX ORDER — AMLODIPINE BESYLATE 10 MG/1
TABLET ORAL
Qty: 90 TABLET | Refills: 0 | Status: SHIPPED | OUTPATIENT
Start: 2023-06-20

## 2023-06-24 SDOH — ECONOMIC STABILITY: INCOME INSECURITY: HOW HARD IS IT FOR YOU TO PAY FOR THE VERY BASICS LIKE FOOD, HOUSING, MEDICAL CARE, AND HEATING?: NOT HARD AT ALL

## 2023-06-24 SDOH — ECONOMIC STABILITY: FOOD INSECURITY: WITHIN THE PAST 12 MONTHS, YOU WORRIED THAT YOUR FOOD WOULD RUN OUT BEFORE YOU GOT MONEY TO BUY MORE.: NEVER TRUE

## 2023-06-24 SDOH — ECONOMIC STABILITY: FOOD INSECURITY: WITHIN THE PAST 12 MONTHS, THE FOOD YOU BOUGHT JUST DIDN'T LAST AND YOU DIDN'T HAVE MONEY TO GET MORE.: NEVER TRUE

## 2023-06-24 SDOH — ECONOMIC STABILITY: HOUSING INSECURITY
IN THE LAST 12 MONTHS, WAS THERE A TIME WHEN YOU DID NOT HAVE A STEADY PLACE TO SLEEP OR SLEPT IN A SHELTER (INCLUDING NOW)?: NO

## 2023-06-24 SDOH — ECONOMIC STABILITY: TRANSPORTATION INSECURITY
IN THE PAST 12 MONTHS, HAS LACK OF TRANSPORTATION KEPT YOU FROM MEETINGS, WORK, OR FROM GETTING THINGS NEEDED FOR DAILY LIVING?: NO

## 2023-06-26 ENCOUNTER — OFFICE VISIT (OUTPATIENT)
Age: 58
End: 2023-06-26
Payer: COMMERCIAL

## 2023-06-26 VITALS
OXYGEN SATURATION: 98 % | SYSTOLIC BLOOD PRESSURE: 118 MMHG | HEIGHT: 68 IN | WEIGHT: 202 LBS | DIASTOLIC BLOOD PRESSURE: 68 MMHG | HEART RATE: 69 BPM | BODY MASS INDEX: 30.62 KG/M2 | RESPIRATION RATE: 18 BRPM

## 2023-06-26 DIAGNOSIS — E11.9 TYPE 2 DIABETES MELLITUS WITHOUT COMPLICATION, WITHOUT LONG-TERM CURRENT USE OF INSULIN (HCC): Primary | ICD-10-CM

## 2023-06-26 DIAGNOSIS — E78.2 MIXED HYPERLIPIDEMIA: ICD-10-CM

## 2023-06-26 DIAGNOSIS — I10 ESSENTIAL (PRIMARY) HYPERTENSION: ICD-10-CM

## 2023-06-26 DIAGNOSIS — Z11.59 ENCOUNTER FOR HEPATITIS C SCREENING TEST FOR LOW RISK PATIENT: ICD-10-CM

## 2023-06-26 DIAGNOSIS — L30.9 DERMATITIS OF LOWER EXTREMITY: ICD-10-CM

## 2023-06-26 LAB — HBA1C MFR BLD: 5.8 %

## 2023-06-26 PROCEDURE — 3074F SYST BP LT 130 MM HG: CPT

## 2023-06-26 PROCEDURE — 3078F DIAST BP <80 MM HG: CPT

## 2023-06-26 PROCEDURE — 99213 OFFICE O/P EST LOW 20 MIN: CPT

## 2023-06-26 PROCEDURE — 83036 HEMOGLOBIN GLYCOSYLATED A1C: CPT

## 2023-06-26 RX ORDER — ATORVASTATIN CALCIUM 40 MG/1
TABLET, FILM COATED ORAL
Qty: 90 TABLET | Refills: 0 | OUTPATIENT
Start: 2023-06-26

## 2023-06-26 SDOH — ECONOMIC STABILITY: TRANSPORTATION INSECURITY
IN THE PAST 12 MONTHS, HAS THE LACK OF TRANSPORTATION KEPT YOU FROM MEDICAL APPOINTMENTS OR FROM GETTING MEDICATIONS?: NO

## 2023-06-26 SDOH — ECONOMIC STABILITY: FOOD INSECURITY: WITHIN THE PAST 12 MONTHS, YOU WORRIED THAT YOUR FOOD WOULD RUN OUT BEFORE YOU GOT MONEY TO BUY MORE.: NEVER TRUE

## 2023-06-26 SDOH — HEALTH STABILITY: PHYSICAL HEALTH: ON AVERAGE, HOW MANY DAYS PER WEEK DO YOU ENGAGE IN MODERATE TO STRENUOUS EXERCISE (LIKE A BRISK WALK)?: 3 DAYS

## 2023-06-26 SDOH — ECONOMIC STABILITY: INCOME INSECURITY: IN THE LAST 12 MONTHS, WAS THERE A TIME WHEN YOU WERE NOT ABLE TO PAY THE MORTGAGE OR RENT ON TIME?: NO

## 2023-06-26 SDOH — HEALTH STABILITY: PHYSICAL HEALTH: ON AVERAGE, HOW MANY MINUTES DO YOU ENGAGE IN EXERCISE AT THIS LEVEL?: 30 MIN

## 2023-06-26 SDOH — ECONOMIC STABILITY: FOOD INSECURITY: WITHIN THE PAST 12 MONTHS, THE FOOD YOU BOUGHT JUST DIDN'T LAST AND YOU DIDN'T HAVE MONEY TO GET MORE.: NEVER TRUE

## 2023-06-26 ASSESSMENT — ENCOUNTER SYMPTOMS
WHEEZING: 0
EYES NEGATIVE: 1
ALLERGIC/IMMUNOLOGIC NEGATIVE: 1
SHORTNESS OF BREATH: 0
CONSTIPATION: 0
RESPIRATORY NEGATIVE: 1
COUGH: 0
DIARRHEA: 0
BLOOD IN STOOL: 0

## 2023-06-26 ASSESSMENT — PATIENT HEALTH QUESTIONNAIRE - PHQ9
SUM OF ALL RESPONSES TO PHQ QUESTIONS 1-9: 0
1. LITTLE INTEREST OR PLEASURE IN DOING THINGS: 0
SUM OF ALL RESPONSES TO PHQ QUESTIONS 1-9: 0
SUM OF ALL RESPONSES TO PHQ9 QUESTIONS 1 & 2: 0
2. FEELING DOWN, DEPRESSED OR HOPELESS: 0

## 2023-06-26 ASSESSMENT — LIFESTYLE VARIABLES
HOW MANY STANDARD DRINKS CONTAINING ALCOHOL DO YOU HAVE ON A TYPICAL DAY: PATIENT DOES NOT DRINK
HOW OFTEN DO YOU HAVE A DRINK CONTAINING ALCOHOL: NEVER

## 2023-06-26 ASSESSMENT — SOCIAL DETERMINANTS OF HEALTH (SDOH)
HOW OFTEN DO YOU GET TOGETHER WITH FRIENDS OR RELATIVES?: THREE TIMES A WEEK
HOW OFTEN DO YOU ATTEND CHURCH OR RELIGIOUS SERVICES?: NEVER
DO YOU BELONG TO ANY CLUBS OR ORGANIZATIONS SUCH AS CHURCH GROUPS UNIONS, FRATERNAL OR ATHLETIC GROUPS, OR SCHOOL GROUPS?: NO
IN A TYPICAL WEEK, HOW MANY TIMES DO YOU TALK ON THE PHONE WITH FAMILY, FRIENDS, OR NEIGHBORS?: MORE THAN THREE TIMES A WEEK
HOW HARD IS IT FOR YOU TO PAY FOR THE VERY BASICS LIKE FOOD, HOUSING, MEDICAL CARE, AND HEATING?: NOT VERY HARD
HOW OFTEN DO YOU ATTENT MEETINGS OF THE CLUB OR ORGANIZATION YOU BELONG TO?: NEVER

## 2023-06-27 LAB
ALBUMIN SERPL-MCNC: 4.7 G/DL (ref 3.5–5)
ALBUMIN/GLOB SERPL: 2 (ref 1.1–2.2)
ALP SERPL-CCNC: 90 U/L (ref 45–117)
ALT SERPL-CCNC: 43 U/L (ref 12–78)
ANION GAP SERPL CALC-SCNC: 5 MMOL/L (ref 5–15)
AST SERPL-CCNC: 27 U/L (ref 15–37)
BASOPHILS # BLD: 0.1 K/UL (ref 0–0.1)
BASOPHILS NFR BLD: 1 % (ref 0–1)
BILIRUB SERPL-MCNC: 0.8 MG/DL (ref 0.2–1)
BUN SERPL-MCNC: 13 MG/DL (ref 6–20)
BUN/CREAT SERPL: 15 (ref 12–20)
CALCIUM SERPL-MCNC: 9.9 MG/DL (ref 8.5–10.1)
CHLORIDE SERPL-SCNC: 104 MMOL/L (ref 97–108)
CO2 SERPL-SCNC: 29 MMOL/L (ref 21–32)
CREAT SERPL-MCNC: 0.85 MG/DL (ref 0.7–1.3)
DIFFERENTIAL METHOD BLD: ABNORMAL
EOSINOPHIL # BLD: 0.2 K/UL (ref 0–0.4)
EOSINOPHIL NFR BLD: 3 % (ref 0–7)
ERYTHROCYTE [DISTWIDTH] IN BLOOD BY AUTOMATED COUNT: 13.1 % (ref 11.5–14.5)
GLOBULIN SER CALC-MCNC: 2.4 G/DL (ref 2–4)
GLUCOSE SERPL-MCNC: 104 MG/DL (ref 65–100)
HCT VFR BLD AUTO: 50.4 % (ref 36.6–50.3)
HCV AB SERPL QL IA: NONREACTIVE
HGB BLD-MCNC: 15.9 G/DL (ref 12.1–17)
IMM GRANULOCYTES # BLD AUTO: 0 K/UL (ref 0–0.04)
IMM GRANULOCYTES NFR BLD AUTO: 0 % (ref 0–0.5)
LYMPHOCYTES # BLD: 2.1 K/UL (ref 0.8–3.5)
LYMPHOCYTES NFR BLD: 33 % (ref 12–49)
MCH RBC QN AUTO: 29.8 PG (ref 26–34)
MCHC RBC AUTO-ENTMCNC: 31.5 G/DL (ref 30–36.5)
MCV RBC AUTO: 94.6 FL (ref 80–99)
MONOCYTES # BLD: 0.5 K/UL (ref 0–1)
MONOCYTES NFR BLD: 8 % (ref 5–13)
NEUTS SEG # BLD: 3.6 K/UL (ref 1.8–8)
NEUTS SEG NFR BLD: 55 % (ref 32–75)
NRBC # BLD: 0 K/UL (ref 0–0.01)
NRBC BLD-RTO: 0 PER 100 WBC
PLATELET # BLD AUTO: 236 K/UL (ref 150–400)
PMV BLD AUTO: 10.5 FL (ref 8.9–12.9)
POTASSIUM SERPL-SCNC: 4.5 MMOL/L (ref 3.5–5.1)
PROT SERPL-MCNC: 7.1 G/DL (ref 6.4–8.2)
RBC # BLD AUTO: 5.33 M/UL (ref 4.1–5.7)
SODIUM SERPL-SCNC: 138 MMOL/L (ref 136–145)
WBC # BLD AUTO: 6.5 K/UL (ref 4.1–11.1)

## 2023-11-03 ENCOUNTER — OFFICE VISIT (OUTPATIENT)
Age: 58
End: 2023-11-03
Payer: COMMERCIAL

## 2023-11-03 VITALS
RESPIRATION RATE: 18 BRPM | WEIGHT: 199.5 LBS | SYSTOLIC BLOOD PRESSURE: 136 MMHG | DIASTOLIC BLOOD PRESSURE: 80 MMHG | HEIGHT: 68 IN | TEMPERATURE: 99.2 F | HEART RATE: 82 BPM | OXYGEN SATURATION: 97 % | BODY MASS INDEX: 30.23 KG/M2

## 2023-11-03 DIAGNOSIS — J40 BRONCHITIS: Primary | ICD-10-CM

## 2023-11-03 PROCEDURE — 3075F SYST BP GE 130 - 139MM HG: CPT

## 2023-11-03 PROCEDURE — 99213 OFFICE O/P EST LOW 20 MIN: CPT

## 2023-11-03 PROCEDURE — 3079F DIAST BP 80-89 MM HG: CPT

## 2023-11-03 RX ORDER — BENZONATATE 100 MG/1
100 CAPSULE ORAL 3 TIMES DAILY PRN
Qty: 30 CAPSULE | Refills: 0 | Status: SHIPPED | OUTPATIENT
Start: 2023-11-03 | End: 2023-11-13

## 2023-11-03 RX ORDER — ALBUTEROL SULFATE 90 UG/1
2 AEROSOL, METERED RESPIRATORY (INHALATION) EVERY 6 HOURS PRN
Qty: 18 G | Refills: 3 | Status: SHIPPED | OUTPATIENT
Start: 2023-11-03

## 2023-11-03 RX ORDER — DOXYCYCLINE HYCLATE 100 MG
100 TABLET ORAL 2 TIMES DAILY
Qty: 28 TABLET | Refills: 0 | Status: SHIPPED | OUTPATIENT
Start: 2023-11-03 | End: 2023-11-17

## 2023-11-03 RX ORDER — METHYLPREDNISOLONE 4 MG/1
TABLET ORAL
Qty: 1 KIT | Refills: 0 | Status: SHIPPED | OUTPATIENT
Start: 2023-11-03 | End: 2023-11-09

## 2023-11-03 ASSESSMENT — ENCOUNTER SYMPTOMS
CHEST TIGHTNESS: 1
COUGH: 1
WHEEZING: 1
EYES NEGATIVE: 1
DIARRHEA: 0
SHORTNESS OF BREATH: 0
ALLERGIC/IMMUNOLOGIC NEGATIVE: 1
CONSTIPATION: 0
BLOOD IN STOOL: 0

## 2023-11-03 NOTE — PROGRESS NOTES
Chief Complaint   Patient presents with    Cough     X 3 weeks, been worse over the last couple of days. No other symptoms. Grandchildren has recently had RSV and he has been around them. Been taking Robitussin DM. Denies SOB. Chest Congestion              HPI:     is a 62 y.o. male who presents for an acute visit. He endorses cough that began about two weeks ago and has become progressively worse. Denies fever, chills, SOB; does not some occasional wheezing that clears with cough. He has been using OTC Tussin DM without much relief. Allergies   Allergen Reactions    Meperidine Anaphylaxis and Other (See Comments)     Caused BP To Drop. Other reaction(s): Other (comments)  Caused BP To Drop. Naproxen Hives    Naproxen Sodium Hives    Promethazine      Other reaction(s): Unknown (comments)  Patient is not allergic       Current Outpatient Medications   Medication Sig Dispense Refill    doxycycline hyclate (VIBRA-TABS) 100 MG tablet Take 1 tablet by mouth 2 times daily for 14 days 28 tablet 0    benzonatate (TESSALON) 100 MG capsule Take 1 capsule by mouth 3 times daily as needed for Cough 30 capsule 0    methylPREDNISolone (MEDROL DOSEPACK) 4 MG tablet Take by mouth. 1 kit 0    albuterol sulfate HFA (PROVENTIL HFA) 108 (90 Base) MCG/ACT inhaler Inhale 2 puffs into the lungs every 6 hours as needed for Wheezing 18 g 3    amLODIPine (NORVASC) 10 MG tablet Take 1 tablet by mouth daily 90 tablet 3    metFORMIN (GLUCOPHAGE) 500 MG tablet TAKE 1 TABLET BY MOUTH TWICE DAILY WITH MEALS 360 tablet 0    KRILL OIL PO Take 1 capsule by mouth daily      atorvastatin (LIPITOR) 40 MG tablet Take 1 tablet by mouth daily      fluticasone (FLONASE) 50 MCG/ACT nasal spray 2 sprays by Nasal route daily      omeprazole (PRILOSEC) 20 MG delayed release capsule Take 1 capsule by mouth daily       No current facility-administered medications for this visit.          Past Medical History:   Diagnosis Date

## 2024-01-02 ENCOUNTER — OFFICE VISIT (OUTPATIENT)
Age: 59
End: 2024-01-02
Payer: COMMERCIAL

## 2024-01-02 VITALS
TEMPERATURE: 98.1 F | SYSTOLIC BLOOD PRESSURE: 136 MMHG | OXYGEN SATURATION: 99 % | RESPIRATION RATE: 18 BRPM | HEIGHT: 68 IN | DIASTOLIC BLOOD PRESSURE: 84 MMHG | HEART RATE: 98 BPM | WEIGHT: 200 LBS | BODY MASS INDEX: 30.31 KG/M2

## 2024-01-02 DIAGNOSIS — E78.2 MIXED HYPERLIPIDEMIA: ICD-10-CM

## 2024-01-02 DIAGNOSIS — Z12.5 ENCOUNTER FOR SCREENING FOR MALIGNANT NEOPLASM OF PROSTATE: ICD-10-CM

## 2024-01-02 DIAGNOSIS — E66.09 CLASS 1 OBESITY DUE TO EXCESS CALORIES WITH SERIOUS COMORBIDITY AND BODY MASS INDEX (BMI) OF 30.0 TO 30.9 IN ADULT: ICD-10-CM

## 2024-01-02 DIAGNOSIS — I10 ESSENTIAL (PRIMARY) HYPERTENSION: ICD-10-CM

## 2024-01-02 DIAGNOSIS — E11.9 TYPE 2 DIABETES MELLITUS WITHOUT COMPLICATION, WITHOUT LONG-TERM CURRENT USE OF INSULIN (HCC): Primary | ICD-10-CM

## 2024-01-02 LAB — HBA1C MFR BLD: 5.4 %

## 2024-01-02 PROCEDURE — 3075F SYST BP GE 130 - 139MM HG: CPT

## 2024-01-02 PROCEDURE — 3079F DIAST BP 80-89 MM HG: CPT

## 2024-01-02 PROCEDURE — 99214 OFFICE O/P EST MOD 30 MIN: CPT

## 2024-01-02 PROCEDURE — 83036 HEMOGLOBIN GLYCOSYLATED A1C: CPT

## 2024-01-02 ASSESSMENT — PATIENT HEALTH QUESTIONNAIRE - PHQ9: DEPRESSION UNABLE TO ASSESS: PT REFUSES

## 2024-01-02 ASSESSMENT — ENCOUNTER SYMPTOMS
COUGH: 0
DIARRHEA: 0
CONSTIPATION: 0
WHEEZING: 0
ALLERGIC/IMMUNOLOGIC NEGATIVE: 1
SHORTNESS OF BREATH: 0
EYES NEGATIVE: 1
RESPIRATORY NEGATIVE: 1
BLOOD IN STOOL: 0

## 2024-01-02 NOTE — PROGRESS NOTES
\"Have you been to the ER, urgent care clinic since your last visit?  Hospitalized since your last visit?\"    NO    “Have you seen or consulted any other health care providers outside of [facility] since your last visit?”    NO         Chief Complaint   Patient presents with    Diabetes    Hypertension       Vitals:    01/02/24 1052   BP: 136/84   Pulse: 98   Resp: (!) 99   Temp: 98.1 °F (36.7 °C)   SpO2: 99%       Labs drawn from left arm per Jodi Sheth NP's orders. Patient tolerated well.

## 2024-01-02 NOTE — PROGRESS NOTES
Chief Complaint   Patient presents with    Diabetes    Hypertension       HPI:     is a 58 y.o. male who presents for chronic follow-up.      T2DM:  Diagnosed earlier this year; compliant with metformin without SEs. Has dramatically changed his diet and has been exercising most days; weight is down 30 pounds.  A1C 5.7-->5.8%.  He is interested in diabetes education for lifestyle management.     HTN:  Well-controlled on amlodipine; denies CP, HA, SOB, dizziness.  Notes a few episodes of \"skipping beats\" over the last couple of months, typically lasts just moments but one episode last 30 minutes; not associated with CP, SOB, [place], or other sx.     HLD:  Atorvastatin caused SE; supplementing with red yeast rice and krill oil.    Allergies   Allergen Reactions    Meperidine Anaphylaxis and Other (See Comments)     Caused BP To Drop.  Other reaction(s): Other (comments)  Caused BP To Drop.    Naproxen Hives    Naproxen Sodium Hives    Promethazine      Other reaction(s): Unknown (comments)  Patient is not allergic       Current Outpatient Medications   Medication Sig Dispense Refill    metFORMIN (GLUCOPHAGE) 500 MG tablet Take 1 tablet by mouth 2 times daily (with meals) 360 tablet 0    albuterol sulfate HFA (PROVENTIL HFA) 108 (90 Base) MCG/ACT inhaler Inhale 2 puffs into the lungs every 6 hours as needed for Wheezing 18 g 3    amLODIPine (NORVASC) 10 MG tablet Take 1 tablet by mouth daily 90 tablet 3    KRILL OIL PO Take 1 capsule by mouth daily      fluticasone (FLONASE) 50 MCG/ACT nasal spray 2 sprays by Nasal route daily      omeprazole (PRILOSEC) 20 MG delayed release capsule Take 1 capsule by mouth daily       No current facility-administered medications for this visit.       Past Medical History:   Diagnosis Date    Anxiety     Costochondritis     Diverticulosis     GERD (gastroesophageal reflux disease)     Hypertension     Rhinitis, allergic        Family History   Problem Relation Age of Onset    Heart

## 2024-01-03 LAB
ANION GAP SERPL CALC-SCNC: 0 MMOL/L (ref 5–15)
BUN SERPL-MCNC: 12 MG/DL (ref 6–20)
BUN/CREAT SERPL: 13 (ref 12–20)
CALCIUM SERPL-MCNC: 9.3 MG/DL (ref 8.5–10.1)
CHLORIDE SERPL-SCNC: 107 MMOL/L (ref 97–108)
CHOLEST SERPL-MCNC: 207 MG/DL
CO2 SERPL-SCNC: 31 MMOL/L (ref 21–32)
CREAT SERPL-MCNC: 0.96 MG/DL (ref 0.7–1.3)
CREAT UR-MCNC: 104 MG/DL
GLUCOSE SERPL-MCNC: 111 MG/DL (ref 65–100)
HDLC SERPL-MCNC: 60 MG/DL
HDLC SERPL: 3.5 (ref 0–5)
LDLC SERPL CALC-MCNC: 120.6 MG/DL (ref 0–100)
MICROALBUMIN UR-MCNC: <0.5 MG/DL
MICROALBUMIN/CREAT UR-RTO: <5 MG/G (ref 0–30)
POTASSIUM SERPL-SCNC: 4.5 MMOL/L (ref 3.5–5.1)
PSA SERPL-MCNC: 0.2 NG/ML (ref 0.01–4)
SODIUM SERPL-SCNC: 138 MMOL/L (ref 136–145)
TRIGL SERPL-MCNC: 132 MG/DL
VLDLC SERPL CALC-MCNC: 26.4 MG/DL

## 2024-01-10 ENCOUNTER — OFFICE VISIT (OUTPATIENT)
Age: 59
End: 2024-01-10
Payer: COMMERCIAL

## 2024-01-10 VITALS
SYSTOLIC BLOOD PRESSURE: 130 MMHG | BODY MASS INDEX: 30.25 KG/M2 | WEIGHT: 199.6 LBS | RESPIRATION RATE: 20 BRPM | DIASTOLIC BLOOD PRESSURE: 70 MMHG | HEIGHT: 68 IN | TEMPERATURE: 98 F | OXYGEN SATURATION: 97 % | HEART RATE: 81 BPM

## 2024-01-10 DIAGNOSIS — R00.2 PALPITATIONS: Primary | ICD-10-CM

## 2024-01-10 PROCEDURE — 99214 OFFICE O/P EST MOD 30 MIN: CPT

## 2024-01-10 PROCEDURE — 3075F SYST BP GE 130 - 139MM HG: CPT

## 2024-01-10 PROCEDURE — 93000 ELECTROCARDIOGRAM COMPLETE: CPT

## 2024-01-10 PROCEDURE — 3078F DIAST BP <80 MM HG: CPT

## 2024-01-10 ASSESSMENT — PATIENT HEALTH QUESTIONNAIRE - PHQ9
2. FEELING DOWN, DEPRESSED OR HOPELESS: 0
SUM OF ALL RESPONSES TO PHQ QUESTIONS 1-9: 0
SUM OF ALL RESPONSES TO PHQ9 QUESTIONS 1 & 2: 0
SUM OF ALL RESPONSES TO PHQ QUESTIONS 1-9: 0
1. LITTLE INTEREST OR PLEASURE IN DOING THINGS: 0

## 2024-01-10 ASSESSMENT — ENCOUNTER SYMPTOMS
CONSTIPATION: 0
ALLERGIC/IMMUNOLOGIC NEGATIVE: 1
SHORTNESS OF BREATH: 0
DIARRHEA: 0
EYES NEGATIVE: 1
WHEEZING: 0
BLOOD IN STOOL: 0
COUGH: 0
RESPIRATORY NEGATIVE: 1

## 2024-01-10 NOTE — PROGRESS NOTES
\"Have you been to the ER, urgent care clinic since your last visit?  Hospitalized since your last visit?\"    NO    “Have you seen or consulted any other health care providers outside of [facility] since your last visit?”    NO       Chief Complaint   Patient presents with    Heart Problem     Rhythm feels off, comes and goes, no pain or SOB        Vitals:    01/10/24 0736   BP: 130/70   Pulse: 81   Resp: 20   Temp: 98 °F (36.7 °C)   SpO2: 97%     
     Effort: Pulmonary effort is normal. No respiratory distress.      Breath sounds: Normal breath sounds. No wheezing, rhonchi or rales.   Skin:     General: Skin is warm and dry.   Neurological:      General: No focal deficit present.      Mental Status: He is alert and oriented to person, place, and time.   Psychiatric:         Mood and Affect: Mood normal.         Behavior: Behavior normal.         Thought Content: Thought content normal.         Judgment: Judgment normal.          ASSESSMENT AND PLAN:      Diagnosis Orders   1. Palpitations  EKG 12 Lead    Ambulatory referral to Cardiology          EKG: normal EKG, normal sinus rhythm, unchanged from previous tracings.  Refer to cardiology for further investigation.  Discussed red flags for which he should seek immediate care, including palpitations that do not resolve, CP, SOB, dizziness, syncope.      Medication Side Effects and Warnings were discussed with patient: Yes  Patient Labs were reviewed:  Yes  Patient Past Records were reviewed:  Yes    Patient aware of plan of care and verbalized understanding. Questions answered. RTC PRN or sooner if needed.    On this date 1/10/2024 I have spent 20 minutes reviewing previous notes, test results and face to face with the patient discussing the diagnosis and importance of compliance with the treatment plan as well as documenting on the day of the visit.    BONNY Alatorre - OSEI

## 2024-01-11 ENCOUNTER — APPOINTMENT (OUTPATIENT)
Facility: HOSPITAL | Age: 59
End: 2024-01-11
Payer: COMMERCIAL

## 2024-01-11 ENCOUNTER — HOSPITAL ENCOUNTER (EMERGENCY)
Facility: HOSPITAL | Age: 59
Discharge: HOME | End: 2024-01-11
Attending: FAMILY MEDICINE | Admitting: FAMILY MEDICINE
Payer: COMMERCIAL

## 2024-01-11 VITALS
TEMPERATURE: 98.3 F | HEART RATE: 90 BPM | WEIGHT: 209 LBS | RESPIRATION RATE: 10 BRPM | OXYGEN SATURATION: 98 % | BODY MASS INDEX: 32.25 KG/M2 | DIASTOLIC BLOOD PRESSURE: 86 MMHG | SYSTOLIC BLOOD PRESSURE: 144 MMHG

## 2024-01-11 DIAGNOSIS — I49.3 PREMATURE VENTRICULAR CONTRACTION ON ELECTROCARDIOGRAM: Primary | ICD-10-CM

## 2024-01-11 LAB
ALBUMIN SERPL-MCNC: 4.7 G/DL (ref 3.5–5)
ALBUMIN/GLOB SERPL: 1.8 (ref 1.1–2.2)
ALP SERPL-CCNC: 78 U/L (ref 45–117)
ALT SERPL-CCNC: 27 U/L (ref 12–78)
ANION GAP SERPL CALC-SCNC: 10 MMOL/L (ref 5–15)
AST SERPL-CCNC: 18 U/L (ref 15–37)
BASOPHILS # BLD: 0.1 K/UL (ref 0–0.1)
BASOPHILS NFR BLD: 1 % (ref 0–1)
BILIRUB SERPL-MCNC: 0.8 MG/DL (ref 0.2–1)
BUN SERPL-MCNC: 15 MG/DL (ref 6–20)
BUN/CREAT SERPL: 15 (ref 12–20)
CALCIUM SERPL-MCNC: 9.7 MG/DL (ref 8.5–10.1)
CHLORIDE SERPL-SCNC: 101 MMOL/L (ref 97–108)
CO2 SERPL-SCNC: 29 MMOL/L (ref 21–32)
CREAT SERPL-MCNC: 0.99 MG/DL (ref 0.7–1.3)
DIFFERENTIAL METHOD BLD: NORMAL
EOSINOPHIL # BLD: 0.1 K/UL (ref 0–0.4)
EOSINOPHIL NFR BLD: 1 % (ref 0–7)
ERYTHROCYTE [DISTWIDTH] IN BLOOD BY AUTOMATED COUNT: 12.5 % (ref 11.5–14.5)
GLOBULIN SER CALC-MCNC: 2.6 G/DL (ref 2–4)
GLUCOSE SERPL-MCNC: 113 MG/DL (ref 65–100)
HCT VFR BLD AUTO: 46.9 % (ref 36.6–50.3)
HGB BLD-MCNC: 16.2 G/DL (ref 12.1–17)
IMM GRANULOCYTES # BLD AUTO: 0 K/UL (ref 0–0.04)
IMM GRANULOCYTES NFR BLD AUTO: 0 % (ref 0–0.5)
LYMPHOCYTES # BLD: 2.2 K/UL (ref 0.8–3.5)
LYMPHOCYTES NFR BLD: 22 % (ref 12–49)
MAGNESIUM SERPL-MCNC: 2.3 MG/DL (ref 1.6–2.4)
MCH RBC QN AUTO: 30.2 PG (ref 26–34)
MCHC RBC AUTO-ENTMCNC: 34.5 G/DL (ref 30–36.5)
MCV RBC AUTO: 87.3 FL (ref 80–99)
MONOCYTES # BLD: 0.7 K/UL (ref 0–1)
MONOCYTES NFR BLD: 7 % (ref 5–13)
NEUTS SEG # BLD: 6.6 K/UL (ref 1.8–8)
NEUTS SEG NFR BLD: 69 % (ref 32–75)
NRBC # BLD: 0 K/UL (ref 0–0.01)
NRBC BLD-RTO: 0 PER 100 WBC
PLATELET # BLD AUTO: 224 K/UL (ref 150–400)
PMV BLD AUTO: 9.5 FL (ref 8.9–12.9)
POTASSIUM SERPL-SCNC: 3.9 MMOL/L (ref 3.5–5.1)
PROT SERPL-MCNC: 7.3 G/DL (ref 6.4–8.2)
RBC # BLD AUTO: 5.37 M/UL (ref 4.1–5.7)
SODIUM SERPL-SCNC: 140 MMOL/L (ref 136–145)
TROPONIN I SERPL HS-MCNC: 4 NG/L (ref 0–76)
WBC # BLD AUTO: 9.6 K/UL (ref 4.1–11.1)

## 2024-01-11 PROCEDURE — 71045 X-RAY EXAM CHEST 1 VIEW: CPT

## 2024-01-11 PROCEDURE — 36415 COLL VENOUS BLD VENIPUNCTURE: CPT

## 2024-01-11 PROCEDURE — 83735 ASSAY OF MAGNESIUM: CPT

## 2024-01-11 PROCEDURE — 84484 ASSAY OF TROPONIN QUANT: CPT

## 2024-01-11 PROCEDURE — 85025 COMPLETE CBC W/AUTO DIFF WBC: CPT

## 2024-01-11 PROCEDURE — 84439 ASSAY OF FREE THYROXINE: CPT

## 2024-01-11 PROCEDURE — 80053 COMPREHEN METABOLIC PANEL: CPT

## 2024-01-11 PROCEDURE — 99285 EMERGENCY DEPT VISIT HI MDM: CPT

## 2024-01-11 PROCEDURE — 84443 ASSAY THYROID STIM HORMONE: CPT

## 2024-01-12 LAB
T4 FREE SERPL-MCNC: 1 NG/DL (ref 0.8–1.5)
TSH SERPL DL<=0.05 MIU/L-ACNC: 1.8 UIU/ML (ref 0.36–3.74)

## 2024-01-12 ASSESSMENT — HEART SCORE: ECG: 0

## 2024-01-12 NOTE — ED PROVIDER NOTES
[facility] EMERGENCY DEP  EMERGENCY DEPARTMENT ENCOUNTER       Pt Name: Wai Davis  MRN: [id number]  Birthdate 1965  Date of evaluation: 1/11/2024  Provider: Rosie Bradshaw MD   PCP: Jodi Sheth APRN - NP  Note Started: 7:56 PM EST 1/11/24     CHIEF COMPLAINT       Chief Complaint   Patient presents with    Irregular Heart Beat        HISTORY OF PRESENT ILLNESS: 1 or more elements      History From: Patient  HPI Limitations: None     Wai Davis is a 58 y.o. male who presents to the ED with an irregular heartbeat that he has had for about a week, intermittently. He reports that the sensation has not been pain, but a feeling that his heart has been irregular, sometimes racing. Usually when he is walking or exerting himself, he has no sensation of a racing heart, but yesterday it did bother him while he was walking up some stairs.  No fevers or chills, cough, chest pain, dizziness, or headaches. No h/o CAD or PAD.       Nursing Notes were all reviewed and agreed with or any disagreements were addressed in the HPI.     REVIEW OF SYSTEMS      Review of Systems     Positives and Pertinent negatives as per HPI.    PAST HISTORY     Past Medical History:  Past Medical History:   Diagnosis Date    Anxiety     Costochondritis     Diverticulosis     GERD (gastroesophageal reflux disease)     Hypertension     Rhinitis, allergic          Past Surgical History:  Past Surgical History:   Procedure Laterality Date    TONSILLECTOMY AND ADENOIDECTOMY Bilateral        Family History:  Family History   Problem Relation Age of Onset    Heart Disease Father     Arrhythmia Father         a fib    Diabetes Father     Cancer Mother 50        colon    Heart Disease Paternal Grandmother     Stroke Paternal Grandmother     Osteoarthritis Sister     COPD Maternal Grandfather     Cancer Paternal Grandfather         Head/Neck--smoker       Social History:  Social History     Tobacco Use    Smoking status: Never    Smokeless tobacco:

## 2024-01-12 NOTE — ED NOTES
verbal and written discharge instructions given. verbalized understanding. Left er ambulatory with family member

## 2024-01-14 LAB
EKG ATRIAL RATE: 104 BPM
EKG DIAGNOSIS: NORMAL
EKG P AXIS: 70 DEGREES
EKG P-R INTERVAL: 142 MS
EKG Q-T INTERVAL: 354 MS
EKG QRS DURATION: 86 MS
EKG QTC CALCULATION (BAZETT): 465 MS
EKG R AXIS: 65 DEGREES
EKG T AXIS: 47 DEGREES
EKG VENTRICULAR RATE: 104 BPM

## 2024-01-15 PROBLEM — I49.3 PVCS (PREMATURE VENTRICULAR CONTRACTIONS): Status: ACTIVE | Noted: 2024-01-15

## 2024-01-15 NOTE — PROGRESS NOTES
visit.     Allergies   Allergen Reactions    Meperidine Anaphylaxis and Other (See Comments)     Caused BP To Drop.  Other reaction(s): Other (comments)  Caused BP To Drop.    Naproxen Hives    Naproxen Sodium Hives    Promethazine      Other reaction(s): Unknown (comments)  Patient is not allergic     Past Medical History:   Diagnosis Date    Anxiety     Costochondritis     Diverticulosis     GERD (gastroesophageal reflux disease)     Hypertension     Rhinitis, allergic      Past Surgical History:   Procedure Laterality Date    TONSILLECTOMY AND ADENOIDECTOMY Bilateral      Family History   Problem Relation Age of Onset    Heart Disease Father     Arrhythmia Father         a fib    Diabetes Father     Cancer Mother 50        colon    Heart Disease Paternal Grandmother     Stroke Paternal Grandmother     Osteoarthritis Sister     COPD Maternal Grandfather     Cancer Paternal Grandfather         Head/Neck--smoker     Social History     Tobacco Use    Smoking status: Never    Smokeless tobacco: Never   Substance Use Topics    Alcohol use: No       REVIEW OF SYSTEMS:  Constitutional: Not present - Anorexia, chills, fatigue, fever, malaise, night sweats, unexplained weight loss   Eyes: Not present - Blurred vision, irritation, pain, photophobia, vision loss,  Ears, Nose, Mouth, Throat: Not present - Headache, pain, tinnitus, vertigo, nasal congestion, epistaxis, hoarseness  Cardiovascular: as per HPI  Respiratory: Not present - Cough, excessive sputum, hemoptysis, wheezing  Gastrointestinal: Not present - Dysphagia, odynophagia, abdominal pain, change in bowel habits, constipation, diarrhea, melena, hematochezia, jaundice, nausea, vomiting   Musculoskeletal: Not present - Back pain, joint pain, joint swelling, muscle cramps, muscle weakness  Integumentary:  Not present - Itching, rash, suspicious lesions  Endocrine: Not present - Cold intolerance, heat intolerance, polydipsia, polyphagia, polyuria, weight

## 2024-01-24 ENCOUNTER — OFFICE VISIT (OUTPATIENT)
Age: 59
End: 2024-01-24
Payer: COMMERCIAL

## 2024-01-24 VITALS
BODY MASS INDEX: 31.64 KG/M2 | HEART RATE: 93 BPM | SYSTOLIC BLOOD PRESSURE: 146 MMHG | RESPIRATION RATE: 20 BRPM | HEIGHT: 67 IN | WEIGHT: 201.6 LBS | DIASTOLIC BLOOD PRESSURE: 88 MMHG | OXYGEN SATURATION: 98 %

## 2024-01-24 DIAGNOSIS — R00.2 PALPITATIONS: Primary | ICD-10-CM

## 2024-01-24 DIAGNOSIS — I10 ESSENTIAL HYPERTENSION: ICD-10-CM

## 2024-01-24 DIAGNOSIS — I49.3 PVCS (PREMATURE VENTRICULAR CONTRACTIONS): ICD-10-CM

## 2024-01-24 PROCEDURE — 99204 OFFICE O/P NEW MOD 45 MIN: CPT | Performed by: INTERNAL MEDICINE

## 2024-01-24 PROCEDURE — 93000 ELECTROCARDIOGRAM COMPLETE: CPT | Performed by: INTERNAL MEDICINE

## 2024-01-24 PROCEDURE — 3077F SYST BP >= 140 MM HG: CPT | Performed by: INTERNAL MEDICINE

## 2024-01-24 PROCEDURE — 3079F DIAST BP 80-89 MM HG: CPT | Performed by: INTERNAL MEDICINE

## 2024-01-24 RX ORDER — GLUCOSAMINE/MSM/CHONDROITIN A 500-83-400
30 TABLET ORAL DAILY
COMMUNITY

## 2024-01-24 ASSESSMENT — PATIENT HEALTH QUESTIONNAIRE - PHQ9
SUM OF ALL RESPONSES TO PHQ QUESTIONS 1-9: 0
SUM OF ALL RESPONSES TO PHQ QUESTIONS 1-9: 0
1. LITTLE INTEREST OR PLEASURE IN DOING THINGS: 0
SUM OF ALL RESPONSES TO PHQ QUESTIONS 1-9: 0
SUM OF ALL RESPONSES TO PHQ9 QUESTIONS 1 & 2: 0
2. FEELING DOWN, DEPRESSED OR HOPELESS: 0
SUM OF ALL RESPONSES TO PHQ QUESTIONS 1-9: 0

## 2024-01-24 NOTE — PATIENT INSTRUCTIONS
I have ordered a 7-day monitor, stress test and echocardiogram for further evaluation of your heart.  We will contact you with results.

## 2024-01-25 ENCOUNTER — OFFICE VISIT (OUTPATIENT)
Age: 59
End: 2024-01-25

## 2024-01-25 DIAGNOSIS — E11.9 CONTROLLED TYPE 2 DIABETES MELLITUS WITHOUT COMPLICATION, WITHOUT LONG-TERM CURRENT USE OF INSULIN (HCC): Primary | ICD-10-CM

## 2024-02-01 ENCOUNTER — NURSE ONLY (OUTPATIENT)
Age: 59
End: 2024-02-01
Payer: COMMERCIAL

## 2024-02-01 DIAGNOSIS — E11.9 CONTROLLED TYPE 2 DIABETES MELLITUS WITHOUT COMPLICATION, WITHOUT LONG-TERM CURRENT USE OF INSULIN (HCC): Primary | ICD-10-CM

## 2024-02-01 PROCEDURE — G0109 DIAB MANAGE TRN IND/GROUP: HCPCS

## 2024-02-01 NOTE — PROGRESS NOTES
Sleep health      The participant has a personal diabetes care record to keep abreast of diabetes health Yes              Darcie Navarro RN, [facility] on 2/1/2024 at 11:27 AM    I have personally reviewed the health record, including provider notes, laboratory data and current medications before making these care and education recommendations. The time spent in this effort is included in the total time.  Total minutes: 125

## 2024-02-01 NOTE — PROGRESS NOTES
[facility]  Diabetes Self-Management Education & Support Program    Reason for Referral: DSMES  Referral Source: Jodi Sheth, BONNY*  Services requested: DSMES       ASSESSMENT    From my perspective, the participant would benefit from DSMES specifically related to reducing risks, healthy eating, monitoring, taking medications, physical activity, healthy coping, and problem solving. Will adapt DSMES program to build on participant's skills score, confidence score, and preparedness score as noted in the Diabetes Skills, Confidence, and Preparedness Index.    During the program, we will focus on providing DSMES that specifically addresses participant's interest in reducing risks, healthy eating, monitoring, taking medications, physical activity, healthy coping, and problem solving, as shown by their reported readiness to change.    The participant would be best served by attending weekly group class series.    Diabetes Self-Management Education Follow-up Visit: 2/1/2024       Clinical Presentation  Wai Davis is a 58 y.o. White male referred for diabetes self-management education. Participant has Type 2 DM not on insulin for <1 year. Family history positive for diabetes. Patient reports not receiving DSMES services in the past. Most recent A1c value:   Lab Results   Component Value Date/Time    HAB3XYCO 5.4 01/02/2024 11:10 AM    LABA1C 8.3 06/06/2022 03:51 PM       Diabetes-related medications:  Current dosing: metFORMIN - 500 MG    Blood Pressure Management  This patient does not have an active medication from one of the medication groupers.      Lipid Management  This patient does not have an active medication from one of the medication groupers.      Clot Prevention  This patient does not have an active medication from one of the medication groupers.    Learning Assessment  Learning objectives Educator assessment (2/1/2024)   Diabetes Disease Process  The participant can   A)

## 2024-02-02 ENCOUNTER — TELEPHONE (OUTPATIENT)
Facility: HOSPITAL | Age: 59
End: 2024-02-02

## 2024-02-05 ENCOUNTER — HOSPITAL ENCOUNTER (OUTPATIENT)
Facility: HOSPITAL | Age: 59
Discharge: HOME | End: 2024-02-07
Attending: INTERNAL MEDICINE
Payer: COMMERCIAL

## 2024-02-05 ENCOUNTER — HOSPITAL ENCOUNTER (OUTPATIENT)
Facility: HOSPITAL | Age: 59
Discharge: HOME | End: 2024-02-08
Attending: INTERNAL MEDICINE
Payer: COMMERCIAL

## 2024-02-05 ENCOUNTER — TELEPHONE (OUTPATIENT)
Age: 59
End: 2024-02-05

## 2024-02-05 DIAGNOSIS — I49.3 PVCS (PREMATURE VENTRICULAR CONTRACTIONS): ICD-10-CM

## 2024-02-05 DIAGNOSIS — E11.9 TYPE 2 DIABETES MELLITUS WITHOUT COMPLICATION, WITHOUT LONG-TERM CURRENT USE OF INSULIN (HCC): Primary | ICD-10-CM

## 2024-02-05 LAB
ECHO AO ROOT DIAM: 3.2 CM
ECHO AV MEAN GRADIENT: 5 MMHG
ECHO AV MEAN VELOCITY: 1.1 M/S
ECHO AV PEAK GRADIENT: 10 MMHG
ECHO AV PEAK VELOCITY: 1.6 M/S
ECHO AV VTI: 31 CM
ECHO EST RA PRESSURE: 3 MMHG
ECHO LA DIAMETER: 4.1 CM
ECHO LA TO AORTIC ROOT RATIO: 1.28
ECHO LA VOL A-L A2C: 57 ML (ref 18–58)
ECHO LA VOL A-L A4C: 44 ML (ref 18–58)
ECHO LA VOL MOD A2C: 56 ML (ref 18–58)
ECHO LA VOL MOD A4C: 41 ML (ref 18–58)
ECHO LA VOLUME AREA LENGTH: 53 ML
ECHO LV E' LATERAL VELOCITY: 11 CM/S
ECHO LV E' SEPTAL VELOCITY: 11 CM/S
ECHO LV FRACTIONAL SHORTENING: 29 % (ref 28–44)
ECHO LV INTERNAL DIMENSION DIASTOLIC: 5.1 CM (ref 4.2–5.9)
ECHO LV INTERNAL DIMENSION SYSTOLIC: 3.6 CM
ECHO LV IVSD: 0.9 CM (ref 0.6–1)
ECHO LV MASS 2D: 163.6 G (ref 88–224)
ECHO LV POSTERIOR WALL DIASTOLIC: 0.9 CM (ref 0.6–1)
ECHO LV RELATIVE WALL THICKNESS RATIO: 0.35
ECHO LVOT AREA: 3.1 CM2
ECHO LVOT DIAM: 2 CM
ECHO MV A VELOCITY: 1.21 M/S
ECHO MV E DECELERATION TIME (DT): 167.1 MS
ECHO MV E VELOCITY: 1.16 M/S
ECHO MV E/A RATIO: 0.96
ECHO MV E/E' LATERAL: 10.55
ECHO MV E/E' RATIO (AVERAGED): 10.55
ECHO PULMONARY ARTERY SYSTOLIC PRESSURE (PASP): 33 MMHG
ECHO RA AREA 4C: 13.7 CM2
ECHO RIGHT VENTRICULAR SYSTOLIC PRESSURE (RVSP): 33 MMHG
ECHO RV TAPSE: 1.9 CM (ref 1.7–?)
ECHO TV REGURGITANT MAX VELOCITY: 2.73 M/S
ECHO TV REGURGITANT PEAK GRADIENT: 30 MMHG
STRESS BASELINE DIAS BP: 104 MMHG
STRESS BASELINE HR: 85 BPM
STRESS BASELINE ST DEPRESSION: 0 MM
STRESS BASELINE SYS BP: 158 MMHG
STRESS ESTIMATED WORKLOAD: 10.1 METS
STRESS EXERCISE DUR MIN: 9 MIN
STRESS EXERCISE DUR SEC: 0 SEC
STRESS PEAK DIAS BP: 89 MMHG
STRESS PEAK SYS BP: 233 MMHG
STRESS PERCENT HR ACHIEVED: 102 %
STRESS POST PEAK HR: 166 BPM
STRESS RATE PRESSURE PRODUCT: NORMAL BPM*MMHG
STRESS ST DEPRESSION: 0 MM
STRESS STAGE 1 DURATION: 3 MIN:SEC
STRESS STAGE 1 HR: 133 BPM
STRESS STAGE 2 BP: NORMAL MMHG
STRESS STAGE 2 DURATION: 3 MIN:SEC
STRESS STAGE 2 HR: 151 BPM
STRESS STAGE 3 BP: NORMAL MMHG
STRESS STAGE 3 DURATION: 3 MIN:SEC
STRESS STAGE 3 HR: 166 BPM
STRESS STAGE 4 DURATION: NORMAL MIN:SEC
STRESS STAGE 4 HR: 166 BPM
STRESS STAGE RECOVERY 1 BP: NORMAL MMHG
STRESS STAGE RECOVERY 1 DURATION: NORMAL MIN:SEC
STRESS STAGE RECOVERY 1 HR: 112 BPM
STRESS TARGET HR: 162 BPM

## 2024-02-05 PROCEDURE — 93016 CV STRESS TEST SUPVJ ONLY: CPT | Performed by: INTERNAL MEDICINE

## 2024-02-05 PROCEDURE — 93306 TTE W/DOPPLER COMPLETE: CPT

## 2024-02-05 PROCEDURE — 93017 CV STRESS TEST TRACING ONLY: CPT

## 2024-02-05 PROCEDURE — 93018 CV STRESS TEST I&R ONLY: CPT | Performed by: INTERNAL MEDICINE

## 2024-02-05 PROCEDURE — 93306 TTE W/DOPPLER COMPLETE: CPT | Performed by: INTERNAL MEDICINE

## 2024-02-05 RX ORDER — BLOOD-GLUCOSE METER
1 KIT MISCELLANEOUS DAILY
Qty: 1 KIT | Refills: 0 | Status: SHIPPED | OUTPATIENT
Start: 2024-02-05

## 2024-02-05 RX ORDER — METOPROLOL SUCCINATE 25 MG/1
25 TABLET, EXTENDED RELEASE ORAL DAILY
Qty: 30 TABLET | Refills: 2 | Status: SHIPPED | OUTPATIENT
Start: 2024-02-05

## 2024-02-05 NOTE — RESULT ENCOUNTER NOTE
Called patient and reviewed stress test, echo and Holter results.  Add metoprolol succinate 25 mg daily to help with palpitations and hypertensive blood pressure response to exercise.  Possible side effects reviewed.  Follow-up as scheduled 4/25/2024.

## 2024-02-06 NOTE — TELEPHONE ENCOUNTER
----- Message from Darcie Navarro RN sent at 2/1/2024 11:31 AM EST -----  Regarding: Rx Request  Good morning,    Mr. Davis is attending DSMES group classes and during our classes we cover glucose monitoring. Would it be possible for you to send in a Rx for a BG meter and testing supplies for him? We would like for him to be able to perform this skill independently if needed and understand how food, exercise, stress, illness, and medications can affect his glucose levels.     Thank you,  Darcie Navarro, RN, [facility]

## 2024-02-08 ENCOUNTER — NURSE ONLY (OUTPATIENT)
Age: 59
End: 2024-02-08

## 2024-02-08 DIAGNOSIS — E11.9 CONTROLLED TYPE 2 DIABETES MELLITUS WITHOUT COMPLICATION, WITHOUT LONG-TERM CURRENT USE OF INSULIN (HCC): Primary | ICD-10-CM

## 2024-02-09 NOTE — PROGRESS NOTES
[facility]  Diabetes Self-Management Education & Support Program  Encounter Note      SUMMARY  Diabetes self-care management training was completed related to healthy eating and monitoring. he participant will return in 1 week to continue DSMES related to taking medications and physical activity. The participant did identify SMART Goal(s) and will practice knowledge and skills related to healthy eating and monitoring to improve diabetes self-management.        EVALUATION:  Wai Davis actively participated in group class.  Participated in reading food labels and correctly identifying CHO content in different portion sizes of CHO, protein and fat.  Discussed the importance of eating a healthy plate to help with BG control.    RECOMMENDATIONS:  Participant to work on SMART goal. Call [facility] if education questions or concerns arise.   Contact provider with any medical concerns.     TOPICS DISCUSSED TODAY:  WHAT CAN I EAT? 60  HOW CAN BLOOD GLUCOSE MONITORING HELP ME? 60      Next provider visit is scheduled for   Future Appointments   Date Time Provider Department Center   2/15/2024  8:30 [facility]   2/22/2024  8:30 [facility]   4/9/2024 10:10 AM [facility]   4/25/2024  9:30 AM Jayy Del Valle MD [facility]          SMART GOAL(S)   Use the healthy plate technique to plan one meal over the next week  ACHIEVEMENT OF GOAL(S) : 0-24%         DATE DSMES TOPIC EVALUATION     2/8/2024 WHAT CAN I EAT?   General principles   Determining a healthy weight   Nutritional terms & tools   Healthy Plate method   Carbohydrate Counting   Reading food labels   Free apps   Pregnancy recommendations      The participant   Uses Healthy Plate principles in constructing meals: Yes  Reads food labels in choosing acceptable foods: Yes       DATE DSMES TOPIC EVALUATION     2/8/2024 HOW CAN BLOOD GLUCOSE

## 2024-02-15 ENCOUNTER — NURSE ONLY (OUTPATIENT)
Age: 59
End: 2024-02-15

## 2024-02-15 DIAGNOSIS — E11.9 CONTROLLED TYPE 2 DIABETES MELLITUS WITHOUT COMPLICATION, WITHOUT LONG-TERM CURRENT USE OF INSULIN (HCC): Primary | ICD-10-CM

## 2024-02-15 NOTE — PROGRESS NOTES
[facility]  Diabetes Self-Management Education & Support Program  Encounter Note      SUMMARY  Diabetes self-care management training was completed related to taking medications and physical activity. The participant will return in 1 week to continue DSMES related to healthy coping and problem solving. The participant did identify SMART Goal(s) and will practice knowledge and skills related to being active and medications to improve diabetes self-management.      EVALUATION:     Wai Davis actively participated in group class.  Demonstrated chair exercises that incorporated balance, pilates, breathing, and core strength  Discussed easy ways to get exercise into their lifestyle.      RECOMMENDATIONS:  Participant to work on SMART goal listed below. Call [facility] if education questions or concerns arise. Contact provider with any medical concerns.      TOPICS DISCUSSED TODAY:  HOW DO MY DIABETES MEDICATIONS WORK? 60  HOW DOES PHYSICAL ACTIVITY AFFECT MY DIABETES? 60      Next provider visit is scheduled for   Future Appointments   Date Time Provider Department Center   2/22/2024  8:30 AM DIABETES GROUP CLASS [facility]   4/9/2024 10:10 AM Jodi Sheth APRN - NP [facility] AMB   4/25/2024  9:30 AM Jayy Del Valle MD [facility] AMB          SMART GOAL(S)   Plan 1 meal using the healthy plate method once this week  ACHIEVEMENT OF GOAL(S) : %             DATE DSMES TOPIC EVALUATION     2/15/2024 HOW DO MY DIABETES MEDICATIONS WORK?   Type 1 medications & methods   Insulin injections   Injection sites   Type 2 medications   Oral agents   GLP-1 agonists   Hypoglycemia symptoms & treatment   Glucagon emergency kits   General guidance regarding insulin use whether Type 1, 2 or gestational diabetes   Storage of insulin   Disposal    Traveling with medications   Barriers to medication adherence      The participant   Can describe the expected action & side effects of

## 2024-02-22 ENCOUNTER — NURSE ONLY (OUTPATIENT)
Age: 59
End: 2024-02-22
Payer: COMMERCIAL

## 2024-02-22 DIAGNOSIS — E11.9 CONTROLLED TYPE 2 DIABETES MELLITUS WITHOUT COMPLICATION, WITHOUT LONG-TERM CURRENT USE OF INSULIN (HCC): Primary | ICD-10-CM

## 2024-02-22 PROCEDURE — G0109 DIAB MANAGE TRN IND/GROUP: HCPCS | Performed by: DIETITIAN, REGISTERED

## 2024-02-22 NOTE — PROGRESS NOTES
[facility]  Diabetes Self-Management Education & Support Program  Encounter Note      SUMMARY  Diabetes self-care management training was completed related to healthy coping and problem solving. he participant will return on March 21 to complete DSMES education.      EVALUATION:  Wai Davis reported feeling  frustrated  when first diagnosed with diabetes but stated they are feeling confused now. Wai Davis handles stress by listening to music and exercise .  Wai Davis does have an emergency plan/kit.     RECOMMENDATIONS:  Wai Davis is willing to continue to work on their SMART goals.  May benefit from exploring various stress management techniques, contacting their provider with any diabetes concerns, and contacting [facility] with any education concerns    TOPICS DISCUSSED TODAY:  HOW DO I FIND SUPPORT TO TACKLE THIS CONDITION? 60  HOW DO I FIGURE OUT WHAT'S INFLUENCING MY BLOOD GLUCOSES? 60      Next provider visit is scheduled for 6 weeks                 DATE DSMES TOPIC EVALUATION     2/22/2024 HOW DO I FIND SUPPORT TO TACKLE THIS CONDITION?   Normal responses to diabetes diagnosis or complication   Shock   Anger & resentment   Guilt/self-blame   Sadness & worry   Depression    Anxiety   Pregnancy   Constructive strategies to normal responses    Exploring feelings & attitudes   Motivation: Cost versus benefits analysis   Problem-solving: Chain analysis   Obtaining support: Communicating   Family & friends   Health team   iii. Community   Stress   Symptoms   Managing stress   Fill your tool kit        The participant can identify people that support them in caring for their diabetes health: spouse      The participant would like to pursue the following in keeping themselves healthy after completing the program:  Reputable websites, reading       DATE DSMES TOPIC EVALUATION     2/22/2024 HOW DO I FIGURE OUT WHAT'S INFLUENCING MY BLOOD GLUCOSES?   Problem solving

## 2024-03-21 ENCOUNTER — TELEPHONE (OUTPATIENT)
Age: 59
End: 2024-03-21

## 2024-04-19 ENCOUNTER — TELEMEDICINE (OUTPATIENT)
Age: 59
End: 2024-04-19
Payer: COMMERCIAL

## 2024-04-19 DIAGNOSIS — E11.9 CONTROLLED TYPE 2 DIABETES MELLITUS WITHOUT COMPLICATION, WITHOUT LONG-TERM CURRENT USE OF INSULIN (HCC): Primary | ICD-10-CM

## 2024-04-19 PROCEDURE — G0108 DIAB MANAGE TRN  PER INDIV: HCPCS

## 2024-04-19 NOTE — PROGRESS NOTES
[facility]  Diabetes Self-Management Education & Support Program  Post-program Evaluation    EVALUATION @ COMPLETION OF THE PROGRAM    Wai Davis completed 9 hours of diabetes self-management education. The participant acquired new knowledge and demonstrated new skills during the program.     The participant worked on the following SMART GOAL(s) to improve their diabetes self-management:    Use the healthy plate technique to plan one meal over the next week   ACHIEVEMENT OF GOAL(S) : %    The participant's pre-program A1c was   Lab Results   Component Value Date/Time    [id number] 5.4 01/02/2024 11:10 AM    LABA1C 8.3 06/06/2022 03:51 PM   . The post-evaluation A1c is Due per participant 5/2024.    The participant improved their Diabetes Skills, Confidence and Preparedness Index (scored out of 7):  Total score: 6.1  Skills: 5.8  Confidence: 6.1  Preparedness: 6.5    FINAL RECOMMENDATIONS:  Communicate with provider regarding recommendations for vaccines/exams and A1c.   Continue to follow healthy eating recommendations, monitor BG and share results with provider, take diabetes medications as prescribed.  Continue to follow your physical activity plan and practice stress management techniques     Next provider visit is scheduled for 4/30/2024       Darcie Navarro RN, [facility] on 4/19/2024     Metric Patient's responses (4/19/2024) Areas for improvement     Healthy Eating       The participant is using the Healthy Plate to control carbohydrate intake - Yes    The participant reads food labels accurately - Yes          Being Active       The participant performs physical activity where your heart beats faster and your breathing is harder than normal for 30 minutes or more? 5 day(s)     In a typical week, the participant performs physical activity 5 day(s)          Monitoring   The participant is monitoring their blood sugars? Yes    The participant is monitoring < 1x/day    Blood sugar

## 2024-04-24 RX ORDER — METOPROLOL SUCCINATE 25 MG/1
25 TABLET, EXTENDED RELEASE ORAL DAILY
Qty: 30 TABLET | Refills: 2 | Status: SHIPPED | OUTPATIENT
Start: 2024-04-24

## 2024-04-24 NOTE — TELEPHONE ENCOUNTER
Pharmacy faxed medication refill    Metoprolol ER 25 MG tab  Take 1 tablet by mouth once daily     QTY: 30    Pharmacy: [address] [phone]   F [phone]     LOV: 1/24/24 Jayy Del Valle MD    ROV: 5/3/24 Marvin Robles NP

## 2024-04-24 NOTE — TELEPHONE ENCOUNTER
Requested Prescriptions     Signed Prescriptions Disp Refills    metoprolol succinate (TOPROL XL) 25 MG extended release tablet 30 tablet 2     Sig: Take 1 tablet by mouth daily     Authorizing Provider: SERGEY PEREZ     Ordering User: MANDY GAVIN

## 2024-04-30 ENCOUNTER — OFFICE VISIT (OUTPATIENT)
Age: 59
End: 2024-04-30
Payer: COMMERCIAL

## 2024-04-30 VITALS
HEIGHT: 67 IN | WEIGHT: 194.8 LBS | TEMPERATURE: 97.5 F | DIASTOLIC BLOOD PRESSURE: 72 MMHG | SYSTOLIC BLOOD PRESSURE: 120 MMHG | OXYGEN SATURATION: 99 % | BODY MASS INDEX: 30.57 KG/M2 | RESPIRATION RATE: 18 BRPM | HEART RATE: 58 BPM

## 2024-04-30 DIAGNOSIS — E11.9 CONTROLLED TYPE 2 DIABETES MELLITUS WITHOUT COMPLICATION, WITHOUT LONG-TERM CURRENT USE OF INSULIN (HCC): Primary | ICD-10-CM

## 2024-04-30 DIAGNOSIS — I49.3 PVCS (PREMATURE VENTRICULAR CONTRACTIONS): ICD-10-CM

## 2024-04-30 DIAGNOSIS — E78.2 MIXED HYPERLIPIDEMIA: ICD-10-CM

## 2024-04-30 DIAGNOSIS — I10 ESSENTIAL HYPERTENSION: ICD-10-CM

## 2024-04-30 LAB — HBA1C MFR BLD: 5.1 %

## 2024-04-30 PROCEDURE — 3078F DIAST BP <80 MM HG: CPT

## 2024-04-30 PROCEDURE — 99214 OFFICE O/P EST MOD 30 MIN: CPT

## 2024-04-30 PROCEDURE — 83036 HEMOGLOBIN GLYCOSYLATED A1C: CPT

## 2024-04-30 PROCEDURE — 3074F SYST BP LT 130 MM HG: CPT

## 2024-04-30 ASSESSMENT — ENCOUNTER SYMPTOMS
EYES NEGATIVE: 1
CONSTIPATION: 0
ALLERGIC/IMMUNOLOGIC NEGATIVE: 1
RESPIRATORY NEGATIVE: 1
WHEEZING: 0
SHORTNESS OF BREATH: 0
BLOOD IN STOOL: 0
COUGH: 0
DIARRHEA: 0

## 2024-04-30 ASSESSMENT — PATIENT HEALTH QUESTIONNAIRE - PHQ9
SUM OF ALL RESPONSES TO PHQ9 QUESTIONS 1 & 2: 0
SUM OF ALL RESPONSES TO PHQ QUESTIONS 1-9: 0
1. LITTLE INTEREST OR PLEASURE IN DOING THINGS: NOT AT ALL
SUM OF ALL RESPONSES TO PHQ QUESTIONS 1-9: 0
2. FEELING DOWN, DEPRESSED OR HOPELESS: NOT AT ALL

## 2024-04-30 NOTE — PROGRESS NOTES
\"Have you been to the ER, urgent care clinic since your last visit?  Hospitalized since your last visit?\"    NO    “Have you seen or consulted any other health care providers outside of [facility] since your last visit?”    NO     “Have you had a colorectal cancer screening such as a colonoscopy/FIT/Cologuard?    NO  3 yrs ago [facility]     Chief Complaint   Patient presents with    Diabetes    Hypertension    Gastroesophageal Reflux       Vitals:    04/30/24 0902   BP: (!) 146/84   Pulse: 58   Resp: 18   Temp: 97.5 °F (36.4 °C)   SpO2: 99%       
  Feeling down, depressed, or hopeless 0   PHQ-2 Score 0   PHQ-9 Total Score 0        Physical Exam  Vitals and nursing note reviewed.   Constitutional:       General: He is not in acute distress.     Appearance: Normal appearance. He is overweight.   HENT:      Head: Normocephalic and atraumatic.   Eyes:      Extraocular Movements: Extraocular movements intact.      Conjunctiva/sclera: Conjunctivae normal.      Pupils: Pupils are equal, round, and reactive to light.   Neck:      Vascular: No carotid bruit.   Cardiovascular:      Rate and Rhythm: Normal rate and regular rhythm.      Pulses: Normal pulses.      Heart sounds: Normal heart sounds. No murmur heard.     No friction rub. No gallop.   Pulmonary:      Effort: Pulmonary effort is normal.      Breath sounds: Normal breath sounds. No wheezing, rhonchi or rales.   Abdominal:      General: Bowel sounds are normal.      Palpations: Abdomen is soft.   Musculoskeletal:         General: Normal range of motion.      Cervical back: Normal range of motion and neck supple.   Lymphadenopathy:      Cervical: No cervical adenopathy.   Skin:     General: Skin is warm and dry.   Neurological:      General: No focal deficit present.      Mental Status: He is alert and oriented to person, place, and time.   Psychiatric:         Mood and Affect: Mood normal.         Behavior: Behavior normal.         Thought Content: Thought content normal.         Judgment: Judgment normal.          ASSESSMENT AND PLAN:     1. Controlled type 2 diabetes mellitus without complication, without long-term current use of insulin ([facility])  -     AMB POC HEMOGLOBIN A1C  -     COLLECTION CAPILLARY BLOOD SPECIMEN  2. Mixed hyperlipidemia  3. Essential hypertension  4. PVCs (premature ventricular contractions)       A1C 5.1% in office today; reduce metformin dose to once daily, continue lifestyle management.  BP initially elevated above goal, but improved when rechecked.        Medication Side Effects and

## 2024-05-05 RX ORDER — METOPROLOL SUCCINATE 25 MG/1
25 TABLET, EXTENDED RELEASE ORAL DAILY
Qty: 30 TABLET | Refills: 2 | Status: CANCELLED | OUTPATIENT
Start: 2024-05-05

## 2024-06-07 ENCOUNTER — OFFICE VISIT (OUTPATIENT)
Age: 59
End: 2024-06-07
Payer: COMMERCIAL

## 2024-06-07 VITALS
DIASTOLIC BLOOD PRESSURE: 80 MMHG | SYSTOLIC BLOOD PRESSURE: 124 MMHG | HEIGHT: 67 IN | RESPIRATION RATE: 20 BRPM | BODY MASS INDEX: 30.64 KG/M2 | HEART RATE: 77 BPM | OXYGEN SATURATION: 97 % | WEIGHT: 195.2 LBS

## 2024-06-07 DIAGNOSIS — I49.3 PVCS (PREMATURE VENTRICULAR CONTRACTIONS): ICD-10-CM

## 2024-06-07 DIAGNOSIS — R00.2 PALPITATIONS: ICD-10-CM

## 2024-06-07 DIAGNOSIS — I10 ESSENTIAL HYPERTENSION: Primary | ICD-10-CM

## 2024-06-07 PROCEDURE — 3079F DIAST BP 80-89 MM HG: CPT | Performed by: NURSE PRACTITIONER

## 2024-06-07 PROCEDURE — 99214 OFFICE O/P EST MOD 30 MIN: CPT | Performed by: NURSE PRACTITIONER

## 2024-06-07 PROCEDURE — 3074F SYST BP LT 130 MM HG: CPT | Performed by: NURSE PRACTITIONER

## 2024-06-07 RX ORDER — BERBERINE CHLOR/SEAWEED/CHROM 500-250 MG
CAPSULE ORAL DAILY
COMMUNITY

## 2024-06-07 ASSESSMENT — PATIENT HEALTH QUESTIONNAIRE - PHQ9
SUM OF ALL RESPONSES TO PHQ QUESTIONS 1-9: 0
SUM OF ALL RESPONSES TO PHQ9 QUESTIONS 1 & 2: 0
SUM OF ALL RESPONSES TO PHQ QUESTIONS 1-9: 0
SUM OF ALL RESPONSES TO PHQ QUESTIONS 1-9: 0
2. FEELING DOWN, DEPRESSED OR HOPELESS: NOT AT ALL
1. LITTLE INTEREST OR PLEASURE IN DOING THINGS: NOT AT ALL
SUM OF ALL RESPONSES TO PHQ QUESTIONS 1-9: 0

## 2024-06-07 NOTE — PATIENT INSTRUCTIONS
Continue to take the metoprolol exactly as prescribed.  If you feel like your heart palpitations increase you can take 2 of these pills once daily.  Please notify us if you do this.    Lets plan on a 6-month follow-up but please reach out to us sooner as needed.  You can be seen here by Dr. Del Valle routinely or if you have any pressing issues you can try to reach out to me at the [place] location.

## 2024-06-07 NOTE — PROGRESS NOTES
Chief Complaint   Patient presents with    Hypertension    Hyperlipidemia    Other     PVC     /80 (Site: Left Upper Arm, Position: Sitting, Cuff Size: Medium Adult)   Pulse 77   Resp 20   Ht 1.702 m (5' 7\")   Wt 88.5 kg (195 lb 3.2 oz)   SpO2 97%   BMI 30.57 kg/m²     
nausea, vomiting   Musculoskeletal: Not present - Back pain, joint pain, joint swelling, muscle cramps, muscle weakness  Integumentary:  Not present - Itching, rash, suspicious lesions  Endocrine: Not present - Cold intolerance, heat intolerance, polydipsia, polyphagia, polyuria, weight change  Hematologic/Lymphatic: Not present - Abnormal bruising, bleeding, enlarged lymph nodes  Neurological: Not present - Paresthesia, seizures, transient paralysis, tremor, vertigo, focal neurologic symptoms  Psychiatric: Not present - Anxiety, depression, disorientation, hallucinations, memory loss, suicidal ideation      VITAL SIGNS:  There were no vitals taken for this visit.     There is no height or weight on file to calculate BMI.    Wt Readings from Last 5 Encounters:   04/30/24 88.4 kg (194 lb 12.8 oz)   01/24/24 91.4 kg (201 lb 9.6 oz)   01/11/24 94.8 kg (209 lb)   01/10/24 90.5 kg (199 lb 9.6 oz)   01/02/24 90.7 kg (200 lb)       BP Readings from Last 5 Encounters:   04/30/24 120/72   01/24/24 (!) 146/88   01/11/24 (!) 144/86   01/10/24 130/70   01/02/24 136/84       PHYSICAL EXAM:  General: Well developed, no distress, cooperative and alert  Eyes: No scleral icterus or conjunctival pallor, pupils equal, EOMI  ENT: Trach midline, neck supple, EOM intact. Thyroid not enlarged  CV: Regular rate and rhythm; normal S1/S2, no gallop, no murmur, no rub, no JVD, normal carotid upstrokes, no carotid bruits.  No ectopic beats.  Respiratory: Adequate air movement with normal effort, clear to auscultation, no wheezes, no ronchi, no rales  Abdomen: Soft, nontender, nondistended, normal bowel sounds. No audible bruits  Extremities: Warm and well perfused, normal cap refill, no clubbing or cyanosis. No edema  Vascular: 2+ pulses symmetric in all extremities  Neuro: No focal neurologic abnormalities  MSK: Normal strength and tone  Skin: No rashes or lesions.  Psych: Appropriate affect      Pertinent studies and test results were

## 2024-06-20 DIAGNOSIS — E11.9 CONTROLLED TYPE 2 DIABETES MELLITUS WITHOUT COMPLICATION, WITHOUT LONG-TERM CURRENT USE OF INSULIN (HCC): ICD-10-CM

## 2024-07-25 RX ORDER — METOPROLOL SUCCINATE 25 MG/1
25 TABLET, EXTENDED RELEASE ORAL DAILY
Qty: 90 TABLET | Refills: 1 | Status: SHIPPED | OUTPATIENT
Start: 2024-07-25

## 2024-07-25 NOTE — TELEPHONE ENCOUNTER
PCP: Jodi Sheth APRN - NP    Last appt: 1/24/2024   Future Appointments   Date Time Provider Department Center   7/29/2024  8:30 AM Jodi Sheth APRN - NP [facility] AMB   11/15/2024  9:20 AM Marvin Robles APRN - NP [facility] AMB       Requested Prescriptions     Signed Prescriptions Disp Refills    metoprolol succinate (TOPROL XL) 25 MG extended release tablet 90 tablet 1     Sig: Take 1 tablet by mouth daily     Authorizing Provider: SERGEY PEREZ     Ordering User: HAROON WILKINSON         Other Comments:  Verbal order per provider.  Order (medication, dose, route, frequency, amount, refills) repeated and verified twice.

## 2024-07-26 SDOH — ECONOMIC STABILITY: FOOD INSECURITY: WITHIN THE PAST 12 MONTHS, YOU WORRIED THAT YOUR FOOD WOULD RUN OUT BEFORE YOU GOT MONEY TO BUY MORE.: NEVER TRUE

## 2024-07-26 SDOH — ECONOMIC STABILITY: INCOME INSECURITY: HOW HARD IS IT FOR YOU TO PAY FOR THE VERY BASICS LIKE FOOD, HOUSING, MEDICAL CARE, AND HEATING?: NOT VERY HARD

## 2024-07-26 SDOH — ECONOMIC STABILITY: FOOD INSECURITY: WITHIN THE PAST 12 MONTHS, THE FOOD YOU BOUGHT JUST DIDN'T LAST AND YOU DIDN'T HAVE MONEY TO GET MORE.: NEVER TRUE

## 2024-07-29 ENCOUNTER — OFFICE VISIT (OUTPATIENT)
Age: 59
End: 2024-07-29
Payer: COMMERCIAL

## 2024-07-29 VITALS
DIASTOLIC BLOOD PRESSURE: 70 MMHG | TEMPERATURE: 97.7 F | WEIGHT: 197 LBS | BODY MASS INDEX: 30.85 KG/M2 | HEART RATE: 56 BPM | SYSTOLIC BLOOD PRESSURE: 130 MMHG | RESPIRATION RATE: 18 BRPM

## 2024-07-29 DIAGNOSIS — I10 ESSENTIAL HYPERTENSION: ICD-10-CM

## 2024-07-29 DIAGNOSIS — E78.2 MIXED HYPERLIPIDEMIA: ICD-10-CM

## 2024-07-29 DIAGNOSIS — R00.2 PALPITATIONS: ICD-10-CM

## 2024-07-29 DIAGNOSIS — E11.9 CONTROLLED TYPE 2 DIABETES MELLITUS WITHOUT COMPLICATION, WITHOUT LONG-TERM CURRENT USE OF INSULIN (HCC): Primary | ICD-10-CM

## 2024-07-29 LAB — HBA1C MFR BLD: 5.2 %

## 2024-07-29 PROCEDURE — 99214 OFFICE O/P EST MOD 30 MIN: CPT

## 2024-07-29 PROCEDURE — 3078F DIAST BP <80 MM HG: CPT

## 2024-07-29 PROCEDURE — 83036 HEMOGLOBIN GLYCOSYLATED A1C: CPT

## 2024-07-29 PROCEDURE — 3075F SYST BP GE 130 - 139MM HG: CPT

## 2024-07-29 ASSESSMENT — PATIENT HEALTH QUESTIONNAIRE - PHQ9
SUM OF ALL RESPONSES TO PHQ QUESTIONS 1-9: 0
2. FEELING DOWN, DEPRESSED OR HOPELESS: NOT AT ALL
SUM OF ALL RESPONSES TO PHQ QUESTIONS 1-9: 0
SUM OF ALL RESPONSES TO PHQ9 QUESTIONS 1 & 2: 0
SUM OF ALL RESPONSES TO PHQ QUESTIONS 1-9: 0
1. LITTLE INTEREST OR PLEASURE IN DOING THINGS: NOT AT ALL
SUM OF ALL RESPONSES TO PHQ QUESTIONS 1-9: 0

## 2024-07-29 ASSESSMENT — ENCOUNTER SYMPTOMS
SHORTNESS OF BREATH: 0
COUGH: 0
WHEEZING: 0
CONSTIPATION: 0
BLOOD IN STOOL: 0
RESPIRATORY NEGATIVE: 1
ALLERGIC/IMMUNOLOGIC NEGATIVE: 1
EYES NEGATIVE: 1
DIARRHEA: 0

## 2024-07-29 NOTE — PROGRESS NOTES
\"Have you been to the ER, urgent care clinic since your last visit?  Hospitalized since your last visit?\"    NO    “Have you seen or consulted any other health care providers outside of [facility] since your last visit?”    NO    “Have you had a colorectal cancer screening such as a colonoscopy/FIT/Cologuard?    NO    Date of last Colonoscopy: 3/3/2021  No cologuard on file  No FIT/FOBT on file   No flexible sigmoidoscopy on file     Chief Complaint   Patient presents with    Diabetes       Vitals:    07/29/24 0823   BP: 130/70   Pulse: 56   Resp: 18   Temp: 97.7 °F (36.5 °C)       Click Here for Release of Records Request    
HFA) 108 (90 Base) MCG/ACT inhaler Inhale 2 puffs into the lungs every 6 hours as needed for Wheezing (Patient not taking: Reported on 7/29/2024) 18 g 3     No current facility-administered medications for this visit.       Past Medical History:   Diagnosis Date    Anxiety     Costochondritis     Diverticulosis     GERD (gastroesophageal reflux disease)     Hypertension     Rhinitis, allergic        Family History   Problem Relation Age of Onset    Heart Disease Father     Arrhythmia Father         a fib    Diabetes Father     Cancer Mother 50        colon    Heart Disease Paternal Grandmother     Stroke Paternal Grandmother     Osteoarthritis Sister     COPD Maternal Grandfather     Cancer Paternal Grandfather         Head/Neck--smoker       Review of Systems   Constitutional:  Negative for chills, fatigue and fever.   HENT: Negative.     Eyes: Negative.    Respiratory: Negative.  Negative for cough, shortness of breath and wheezing.    Cardiovascular:  Negative for chest pain, palpitations and leg swelling.   Gastrointestinal:  Negative for blood in stool, constipation and diarrhea.   Endocrine: Negative.    Genitourinary: Negative.    Musculoskeletal: Negative.    Skin: Negative.    Allergic/Immunologic: Negative.    Neurological: Negative.  Negative for dizziness and headaches.   Hematological: Negative.    Psychiatric/Behavioral: Negative.  Negative for dysphoric mood and sleep disturbance. The patient is not nervous/anxious.           Vitals:    07/29/24 0823   BP: 130/70   Pulse: 56   Resp: 18   Temp: 97.7 °F (36.5 °C)   Weight: 89.4 kg (197 lb)        Wt Readings from Last 3 Encounters:   07/29/24 89.4 kg (197 lb)   06/07/24 88.5 kg (195 lb 3.2 oz)   04/30/24 88.4 kg (194 lb 12.8 oz)           7/29/2024     8:22 AM   PHQ-9    Little interest or pleasure in doing things 0   Feeling down, depressed, or hopeless 0   PHQ-2 Score 0   PHQ-9 Total Score 0        Physical Exam  Vitals and nursing note reviewed.

## 2024-10-03 RX ORDER — AMLODIPINE BESYLATE 10 MG/1
10 TABLET ORAL DAILY
Qty: 90 TABLET | Refills: 1 | Status: SHIPPED | OUTPATIENT
Start: 2024-10-03

## 2024-11-15 ENCOUNTER — OFFICE VISIT (OUTPATIENT)
Age: 59
End: 2024-11-15
Payer: COMMERCIAL

## 2024-11-15 VITALS
WEIGHT: 200 LBS | SYSTOLIC BLOOD PRESSURE: 120 MMHG | HEIGHT: 67 IN | HEART RATE: 63 BPM | BODY MASS INDEX: 31.39 KG/M2 | TEMPERATURE: 98.9 F | RESPIRATION RATE: 16 BRPM | OXYGEN SATURATION: 97 % | DIASTOLIC BLOOD PRESSURE: 80 MMHG

## 2024-11-15 DIAGNOSIS — R00.2 PALPITATIONS: ICD-10-CM

## 2024-11-15 DIAGNOSIS — I10 ESSENTIAL HYPERTENSION: Primary | ICD-10-CM

## 2024-11-15 DIAGNOSIS — I49.3 PVCS (PREMATURE VENTRICULAR CONTRACTIONS): ICD-10-CM

## 2024-11-15 PROCEDURE — 99213 OFFICE O/P EST LOW 20 MIN: CPT | Performed by: NURSE PRACTITIONER

## 2024-11-15 PROCEDURE — 3074F SYST BP LT 130 MM HG: CPT | Performed by: NURSE PRACTITIONER

## 2024-11-15 PROCEDURE — 3079F DIAST BP 80-89 MM HG: CPT | Performed by: NURSE PRACTITIONER

## 2024-11-15 NOTE — PROGRESS NOTES
Identified pt with two pt identifiers(name and ). Reviewed record in preparation for visit and have obtained necessary documentation.  Chief Complaint   Patient presents with    Other     PVC    Hypertension    Cholesterol Problem      /80 (Site: Left Upper Arm, Position: Sitting, Cuff Size: Medium Adult)   Pulse 63   Temp 98.9 °F (37.2 °C) (Temporal)   Resp 16   Ht 1.702 m (5' 7\")   Wt 90.7 kg (200 lb)   SpO2 97%   BMI 31.32 kg/m²       Medications reviewed/approved by provider.      Health Maintenance Review: Patient reminded of \"due or due soon\" health maintenance. I have asked the patient to contact his/her primary care provider (PCP) for follow-up on his/her health maintenance.    Coordination of Care Questionnaire:  :   1) Have you been to an emergency room, urgent care, or hospitalized since your last visit?  If yes, where when, and reason for visit? no       2. Have seen or consulted any other health care provider since your last visit?   If yes, where when, and reason for visit?  no      Patient is accompanied by SELF I have received verbal consent from Wai Davis to discuss any/all medical information while they are present in the room.    
90.7 kg (200 lb)   SpO2 97% Comment: RA  BMI 31.32 kg/m²      Body mass index is 31.32 kg/m².    Wt Readings from Last 5 Encounters:   11/15/24 90.7 kg (200 lb)   07/29/24 89.4 kg (197 lb)   06/07/24 88.5 kg (195 lb 3.2 oz)   04/30/24 88.4 kg (194 lb 12.8 oz)   01/24/24 91.4 kg (201 lb 9.6 oz)       BP Readings from Last 5 Encounters:   11/15/24 120/80   07/29/24 130/70   06/07/24 124/80   04/30/24 120/72   01/24/24 (!) 146/88       PHYSICAL EXAM:  General: Well developed, no distress, cooperative and alert  Eyes: No scleral icterus or conjunctival pallor, pupils equal, EOMI  ENT: Trach midline, neck supple, EOM intact. Thyroid not enlarged  CV: Regular rate and rhythm; normal S1/S2, no gallop, no murmur, no rub, no JVD, normal carotid upstrokes, no carotid bruits.  No ectopic beats.  Respiratory: Adequate air movement with normal effort, clear to auscultation, no wheezes, no ronchi, no rales  Abdomen: Soft, nontender, nondistended, normal bowel sounds. No audible bruits  Extremities: Warm and well perfused, normal cap refill, no clubbing or cyanosis. No edema  Vascular: 2+ pulses symmetric in all extremities  Neuro: No focal neurologic abnormalities  MSK: Normal strength and tone  Skin: No rashes or lesions.  Psych: Appropriate affect      Pertinent studies and test results were reviewed with the patient today.     CBC   Lab Results   Component Value Date    WBC 9.6 01/11/2024    HGB 16.2 01/11/2024    HCT 46.9 01/11/2024    MCV 87.3 01/11/2024     01/11/2024       CMP  Lab Results   Component Value Date     01/11/2024    K 3.9 01/11/2024     01/11/2024    CO2 29 01/11/2024    BUN 15 01/11/2024    CREATININE 0.99 01/11/2024    GLUCOSE 113 (H) 01/11/2024    CALCIUM 9.7 01/11/2024    BILITOT 0.8 01/11/2024    ALKPHOS 78 01/11/2024    AST 18 01/11/2024    ALT 27 01/11/2024    LABGLOM >60 01/11/2024    GFRAA >60 05/25/2022    AGRATIO 1.4 05/25/2022    GLOB 2.6 01/11/2024       LIPIDS  Lab Results

## 2025-01-28 DIAGNOSIS — E11.9 CONTROLLED TYPE 2 DIABETES MELLITUS WITHOUT COMPLICATION, WITHOUT LONG-TERM CURRENT USE OF INSULIN (HCC): ICD-10-CM

## 2025-01-29 RX ORDER — METOPROLOL SUCCINATE 25 MG/1
25 TABLET, EXTENDED RELEASE ORAL DAILY
Qty: 90 TABLET | Refills: 2 | Status: SHIPPED | OUTPATIENT
Start: 2025-01-29

## 2025-01-29 NOTE — TELEPHONE ENCOUNTER
PCP: Jodi Sheth APRN - NP    Last appt: 1/24/2024   Future Appointments   Date Time Provider Department Center   2/7/2025  9:10 AM [facility]   11/18/2025  9:40 AM Sergey Perez MD [facility] BS AMB       Requested Prescriptions     Signed Prescriptions Disp Refills    metoprolol succinate (TOPROL XL) 25 MG extended release tablet 90 tablet 2     Sig: Take 1 tablet by mouth daily     Authorizing Provider: SERGEY PEREZ     Ordering User: HAROON WILKINSON         Other Comments:  Verbal order per provider.  Order (medication, dose, route, frequency, amount, refills) repeated and verified twice.

## 2025-03-12 ENCOUNTER — OFFICE VISIT (OUTPATIENT)
Age: 60
End: 2025-03-12

## 2025-03-12 VITALS
OXYGEN SATURATION: 98 % | DIASTOLIC BLOOD PRESSURE: 80 MMHG | TEMPERATURE: 97.4 F | WEIGHT: 204 LBS | HEART RATE: 65 BPM | HEIGHT: 68 IN | RESPIRATION RATE: 18 BRPM | BODY MASS INDEX: 30.92 KG/M2 | SYSTOLIC BLOOD PRESSURE: 136 MMHG

## 2025-03-12 DIAGNOSIS — E78.2 MIXED HYPERLIPIDEMIA: ICD-10-CM

## 2025-03-12 DIAGNOSIS — I10 ESSENTIAL HYPERTENSION: ICD-10-CM

## 2025-03-12 DIAGNOSIS — Z23 NEEDS FLU SHOT: ICD-10-CM

## 2025-03-12 DIAGNOSIS — Z00.01 ENCOUNTER FOR WELL ADULT EXAM WITH ABNORMAL FINDINGS: Primary | ICD-10-CM

## 2025-03-12 DIAGNOSIS — H61.23 BILATERAL IMPACTED CERUMEN: ICD-10-CM

## 2025-03-12 DIAGNOSIS — E66.09 CLASS 1 OBESITY DUE TO EXCESS CALORIES WITH SERIOUS COMORBIDITY AND BODY MASS INDEX (BMI) OF 31.0 TO 31.9 IN ADULT: ICD-10-CM

## 2025-03-12 DIAGNOSIS — E66.811 CLASS 1 OBESITY DUE TO EXCESS CALORIES WITH SERIOUS COMORBIDITY AND BODY MASS INDEX (BMI) OF 31.0 TO 31.9 IN ADULT: ICD-10-CM

## 2025-03-12 DIAGNOSIS — E11.9 CONTROLLED TYPE 2 DIABETES MELLITUS WITHOUT COMPLICATION, WITHOUT LONG-TERM CURRENT USE OF INSULIN (HCC): ICD-10-CM

## 2025-03-12 DIAGNOSIS — Z12.5 ENCOUNTER FOR SCREENING FOR MALIGNANT NEOPLASM OF PROSTATE: ICD-10-CM

## 2025-03-12 SDOH — ECONOMIC STABILITY: FOOD INSECURITY: WITHIN THE PAST 12 MONTHS, THE FOOD YOU BOUGHT JUST DIDN'T LAST AND YOU DIDN'T HAVE MONEY TO GET MORE.: NEVER TRUE

## 2025-03-12 SDOH — ECONOMIC STABILITY: INCOME INSECURITY: IN THE LAST 12 MONTHS, WAS THERE A TIME WHEN YOU WERE NOT ABLE TO PAY THE MORTGAGE OR RENT ON TIME?: NO

## 2025-03-12 SDOH — ECONOMIC STABILITY: FOOD INSECURITY: WITHIN THE PAST 12 MONTHS, YOU WORRIED THAT YOUR FOOD WOULD RUN OUT BEFORE YOU GOT MONEY TO BUY MORE.: NEVER TRUE

## 2025-03-12 ASSESSMENT — PATIENT HEALTH QUESTIONNAIRE - PHQ9
SUM OF ALL RESPONSES TO PHQ QUESTIONS 1-9: 0
1. LITTLE INTEREST OR PLEASURE IN DOING THINGS: NOT AT ALL
SUM OF ALL RESPONSES TO PHQ QUESTIONS 1-9: 0
2. FEELING DOWN, DEPRESSED OR HOPELESS: NOT AT ALL

## 2025-03-12 ASSESSMENT — ENCOUNTER SYMPTOMS
WHEEZING: 0
BLOOD IN STOOL: 0
COUGH: 0
RESPIRATORY NEGATIVE: 1
EYES NEGATIVE: 1
DIARRHEA: 0
CONSTIPATION: 0
SHORTNESS OF BREATH: 0
ALLERGIC/IMMUNOLOGIC NEGATIVE: 1

## 2025-03-12 NOTE — PROGRESS NOTES
Patient here for labs  drawn from right  antecubital without pain or bruising.   
Patient tolerated bilateral ear irrigation with warm water without discomfort or dizziness  
Patient was administered vaccine Flu in left deltoid via IM.  Patient tolerated well.  Medication information reviewed with patient, patient states understanding. Patient to resume routine medications at home.  Patient given copy of AVS and VIIS with medication information and instructions for home. VIIS reviewed with patient and patient states understanding.       
\"Have you been to the ER, urgent care clinic since your last visit?  Hospitalized since your last visit?\"    NO    “Have you seen or consulted any other health care providers outside of [facility] since your last visit?”    NO        “Have you had a colorectal cancer screening such as a colonoscopy/FIT/Cologuard?     Yes last year, DR Chase records requested    Date of last Colonoscopy: 3/3/2021  No cologuard on file  No FIT/FOBT on file   No flexible sigmoidoscopy on file         Click Here for Release of Records Request      Chief Complaint   Patient presents with    Annual Exam         Vitals:    03/12/25 0825   BP: 136/80   Pulse: 65   Resp: 18   Temp: 97.4 °F (36.3 °C)   SpO2: 98%     
kit by Does not apply route daily Yes Jodi Sheth H, APRN - NP   Coenzyme Q10 (COQ-10) 30 MG CAPS Take 30 mg by mouth daily Yes Provider, MD Dominic   albuterol sulfate HFA (PROVENTIL HFA) 108 (90 Base) MCG/ACT inhaler Inhale 2 puffs into the lungs every 6 hours as needed for Wheezing Yes Jodi Sheth H, APRN - NP   KRILL OIL PO Take 1 capsule by mouth daily Yes Automatic Reconciliation, Ar   fluticasone (FLONASE) 50 MCG/ACT nasal spray 2 sprays by Nasal route daily Yes Automatic Reconciliation, Ar   omeprazole (PRILOSEC) 20 MG delayed release capsule Take 1 capsule by mouth daily As needed Yes Automatic Reconciliation, Ar     Past Medical History:   Diagnosis Date   • Anxiety    • Costochondritis    • Diverticulosis    • GERD (gastroesophageal reflux disease)    • Hypertension    • Rhinitis, allergic      Past Surgical History:   Procedure Laterality Date   • TONSILLECTOMY AND ADENOIDECTOMY Bilateral      Family History   Problem Relation Age of Onset   • Heart Disease Father    • Arrhythmia Father         a fib   • Diabetes Father    • Cancer Mother 50        colon   • Heart Disease Paternal Grandmother    • Stroke Paternal Grandmother    • Osteoarthritis Sister    • COPD Maternal Grandfather    • Cancer Paternal Grandfather         Head/Neck--smoker     Social History     Tobacco Use   • Smoking status: Never   • Smokeless tobacco: Never   Vaping Use   • Vaping status: Never Used   Substance Use Topics   • Alcohol use: No   • Drug use: Never           Objective    Vital Signs  /80 (BP Site: Left Upper Arm, Patient Position: Sitting, BP Cuff Size: Medium Adult)   Pulse 65   Temp 97.4 °F (36.3 °C) (Temporal)   Resp 18   Ht 1.727 m (5' 8\")   Wt 92.5 kg (204 lb)   SpO2 98%   BMI 31.02 kg/m²     Wt Readings from Last 3 Encounters:   03/12/25 92.5 kg (204 lb)   11/15/24 90.7 kg (200 lb)   07/29/24 89.4 kg (197 lb)       Physical Exam  Vitals and nursing note reviewed.   Constitutional:

## 2025-03-12 NOTE — ASSESSMENT & PLAN NOTE
Chronic, at goal (stable), continue current plan pending work up below  Orders:    COLLECTION VENOUS BLOOD,VENIPUNCTURE    Lipid Panel; Future

## 2025-03-12 NOTE — ASSESSMENT & PLAN NOTE
Chronic, at goal (stable), continue current plan pending work up below  Orders:    COLLECTION VENOUS BLOOD,VENIPUNCTURE    Lipid Panel; Future    CBC with Auto Differential; Future    Comprehensive Metabolic Panel; Future    TSH; Future    Hemoglobin A1C; Future    Albumin/Creatinine Ratio, Urine; Future

## 2025-03-12 NOTE — ASSESSMENT & PLAN NOTE
Chronic, at goal (stable), continue current treatment plan  Orders:    COLLECTION VENOUS BLOOD,VENIPUNCTURE    CBC with Auto Differential; Future    Comprehensive Metabolic Panel; Future    TSH; Future

## 2025-03-12 NOTE — PATIENT INSTRUCTIONS
hands, brush your teeth twice a day, and wear a seat belt in the car.   Where can you learn more?  Go to [email or web address]://www.[email or web address].net/patientEd and enter P072 to learn more about \"Well Visit, Ages 18 to 65: Care Instructions.\"  Current as of: April 30, 2024  Content Version: 14.3  © 2024 [employer].   Care instructions adapted under license by [employer]. If you have questions about a medical condition or this instruction, always ask your healthcare professional. [employer] disclaims any warranty or liability for your use of this information.

## 2025-03-13 ENCOUNTER — RESULTS FOLLOW-UP (OUTPATIENT)
Age: 60
End: 2025-03-13

## 2025-03-13 LAB
ALBUMIN SERPL-MCNC: 4.6 G/DL (ref 3.5–5)
ALBUMIN/GLOB SERPL: 1.8 (ref 1.1–2.2)
ALP SERPL-CCNC: 78 U/L (ref 45–117)
ALT SERPL-CCNC: 31 U/L (ref 12–78)
ANION GAP SERPL CALC-SCNC: 9 MMOL/L (ref 2–12)
AST SERPL-CCNC: 26 U/L (ref 15–37)
BASOPHILS # BLD: 0.07 K/UL (ref 0–0.1)
BASOPHILS NFR BLD: 1 % (ref 0–1)
BILIRUB SERPL-MCNC: 0.8 MG/DL (ref 0.2–1)
BUN SERPL-MCNC: 14 MG/DL (ref 6–20)
BUN/CREAT SERPL: 15 (ref 12–20)
CALCIUM SERPL-MCNC: 9.5 MG/DL (ref 8.5–10.1)
CHLORIDE SERPL-SCNC: 104 MMOL/L (ref 97–108)
CHOLEST SERPL-MCNC: 206 MG/DL
CO2 SERPL-SCNC: 27 MMOL/L (ref 21–32)
CREAT SERPL-MCNC: 0.95 MG/DL (ref 0.7–1.3)
CREAT UR-MCNC: 21.1 MG/DL
DIFFERENTIAL METHOD BLD: NORMAL
EOSINOPHIL # BLD: 0.28 K/UL (ref 0–0.4)
EOSINOPHIL NFR BLD: 4.1 % (ref 0–7)
ERYTHROCYTE [DISTWIDTH] IN BLOOD BY AUTOMATED COUNT: 12.7 % (ref 11.5–14.5)
EST. AVERAGE GLUCOSE BLD GHB EST-MCNC: 108 MG/DL
GLOBULIN SER CALC-MCNC: 2.6 G/DL (ref 2–4)
GLUCOSE SERPL-MCNC: 118 MG/DL (ref 65–100)
HBA1C MFR BLD: 5.4 % (ref 4–5.6)
HCT VFR BLD AUTO: 49.8 % (ref 36.6–50.3)
HDLC SERPL-MCNC: 60 MG/DL
HDLC SERPL: 3.4 (ref 0–5)
HGB BLD-MCNC: 16.2 G/DL (ref 12.1–17)
IMM GRANULOCYTES # BLD AUTO: 0.02 K/UL (ref 0–0.04)
IMM GRANULOCYTES NFR BLD AUTO: 0.3 % (ref 0–0.5)
LDLC SERPL CALC-MCNC: 121.6 MG/DL (ref 0–100)
LYMPHOCYTES # BLD: 1.96 K/UL (ref 0.8–3.5)
LYMPHOCYTES NFR BLD: 28.9 % (ref 12–49)
MCH RBC QN AUTO: 30.7 PG (ref 26–34)
MCHC RBC AUTO-ENTMCNC: 32.5 G/DL (ref 30–36.5)
MCV RBC AUTO: 94.3 FL (ref 80–99)
MICROALBUMIN UR-MCNC: <0.5 MG/DL
MICROALBUMIN/CREAT UR-RTO: <24 MG/G (ref 0–30)
MONOCYTES # BLD: 0.63 K/UL (ref 0–1)
MONOCYTES NFR BLD: 9.3 % (ref 5–13)
NEUTS SEG # BLD: 3.83 K/UL (ref 1.8–8)
NEUTS SEG NFR BLD: 56.4 % (ref 32–75)
NRBC # BLD: 0 K/UL (ref 0–0.01)
NRBC BLD-RTO: 0 PER 100 WBC
PLATELET # BLD AUTO: 235 K/UL (ref 150–400)
PMV BLD AUTO: 10.4 FL (ref 8.9–12.9)
POTASSIUM SERPL-SCNC: 4 MMOL/L (ref 3.5–5.1)
PROT SERPL-MCNC: 7.2 G/DL (ref 6.4–8.2)
PSA SERPL-MCNC: 0.3 NG/ML (ref 0.01–4)
RBC # BLD AUTO: 5.28 M/UL (ref 4.1–5.7)
SODIUM SERPL-SCNC: 140 MMOL/L (ref 136–145)
TRIGL SERPL-MCNC: 122 MG/DL
TSH SERPL DL<=0.05 MIU/L-ACNC: 1.76 UIU/ML (ref 0.36–3.74)
VLDLC SERPL CALC-MCNC: 24.4 MG/DL
WBC # BLD AUTO: 6.8 K/UL (ref 4.1–11.1)

## 2025-03-24 RX ORDER — AMLODIPINE BESYLATE 10 MG/1
10 TABLET ORAL DAILY
Qty: 90 TABLET | Refills: 3 | Status: SHIPPED | OUTPATIENT
Start: 2025-03-24